# Patient Record
Sex: MALE | HISPANIC OR LATINO | Employment: FULL TIME | URBAN - METROPOLITAN AREA
[De-identification: names, ages, dates, MRNs, and addresses within clinical notes are randomized per-mention and may not be internally consistent; named-entity substitution may affect disease eponyms.]

---

## 2024-04-25 ENCOUNTER — HOSPITAL ENCOUNTER (EMERGENCY)
Facility: CLINIC | Age: 29
Discharge: HOME | End: 2024-04-25
Admitting: PHYSICIAN ASSISTANT
Payer: OTHER MISCELLANEOUS

## 2024-04-25 ENCOUNTER — APPOINTMENT (OUTPATIENT)
Dept: RADIOLOGY | Facility: CLINIC | Age: 29
End: 2024-04-25
Attending: PHYSICIAN ASSISTANT
Payer: OTHER MISCELLANEOUS

## 2024-04-25 VITALS
OXYGEN SATURATION: 97 % | HEIGHT: 68 IN | TEMPERATURE: 98 F | HEART RATE: 86 BPM | SYSTOLIC BLOOD PRESSURE: 170 MMHG | DIASTOLIC BLOOD PRESSURE: 101 MMHG | WEIGHT: 315 LBS | RESPIRATION RATE: 20 BRPM | BODY MASS INDEX: 47.74 KG/M2

## 2024-04-25 DIAGNOSIS — M54.12 CERVICAL RADICULOPATHY: ICD-10-CM

## 2024-04-25 DIAGNOSIS — M62.830 SPASM OF LEFT TRAPEZIUS MUSCLE: ICD-10-CM

## 2024-04-25 PROBLEM — I10 HTN (HYPERTENSION): Status: ACTIVE | Noted: 2023-10-30

## 2024-04-25 PROBLEM — H10.9 CONJUNCTIVITIS: Status: ACTIVE | Noted: 2024-04-25

## 2024-04-25 PROBLEM — B01.9 VARICELLA: Status: ACTIVE | Noted: 2024-04-25

## 2024-04-25 PROBLEM — E11.29 TYPE 2 DIABETES MELLITUS WITH MICROALBUMINURIA (H): Status: ACTIVE | Noted: 2023-10-30

## 2024-04-25 PROBLEM — R80.9 TYPE 2 DIABETES MELLITUS WITH MICROALBUMINURIA (H): Status: ACTIVE | Noted: 2023-10-30

## 2024-04-25 PROBLEM — H60.399 INFECTIVE OTITIS EXTERNA: Status: ACTIVE | Noted: 2024-04-25

## 2024-04-25 PROCEDURE — 250N000013 HC RX MED GY IP 250 OP 250 PS 637: Performed by: PHYSICIAN ASSISTANT

## 2024-04-25 PROCEDURE — 99284 EMERGENCY DEPT VISIT MOD MDM: CPT

## 2024-04-25 PROCEDURE — 72100 X-RAY EXAM L-S SPINE 2/3 VWS: CPT

## 2024-04-25 PROCEDURE — 72040 X-RAY EXAM NECK SPINE 2-3 VW: CPT

## 2024-04-25 PROCEDURE — 20552 NJX 1/MLT TRIGGER POINT 1/2: CPT

## 2024-04-25 PROCEDURE — 73030 X-RAY EXAM OF SHOULDER: CPT | Mod: LT

## 2024-04-25 RX ORDER — METHYLPREDNISOLONE 4 MG
TABLET, DOSE PACK ORAL
Qty: 21 TABLET | Refills: 0 | Status: SHIPPED | OUTPATIENT
Start: 2024-04-25

## 2024-04-25 RX ORDER — LIDOCAINE 4 G/G
1 PATCH TOPICAL ONCE
Status: DISCONTINUED | OUTPATIENT
Start: 2024-04-25 | End: 2024-04-25 | Stop reason: HOSPADM

## 2024-04-25 RX ORDER — GABAPENTIN 300 MG/1
300 CAPSULE ORAL AT BEDTIME
Qty: 14 CAPSULE | Refills: 0 | Status: SHIPPED | OUTPATIENT
Start: 2024-04-25 | End: 2024-08-15

## 2024-04-25 RX ADMIN — LIDOCAINE 1 PATCH: 4 PATCH TOPICAL at 11:38

## 2024-04-25 RX ADMIN — LIDOCAINE 1 PATCH: 4 PATCH TOPICAL at 11:39

## 2024-04-25 ASSESSMENT — COLUMBIA-SUICIDE SEVERITY RATING SCALE - C-SSRS
1. IN THE PAST MONTH, HAVE YOU WISHED YOU WERE DEAD OR WISHED YOU COULD GO TO SLEEP AND NOT WAKE UP?: NO
2. HAVE YOU ACTUALLY HAD ANY THOUGHTS OF KILLING YOURSELF IN THE PAST MONTH?: NO
6. HAVE YOU EVER DONE ANYTHING, STARTED TO DO ANYTHING, OR PREPARED TO DO ANYTHING TO END YOUR LIFE?: NO

## 2024-04-25 ASSESSMENT — ACTIVITIES OF DAILY LIVING (ADL)
ADLS_ACUITY_SCORE: 35

## 2024-04-25 NOTE — ED PROVIDER NOTES
EMERGENCY DEPARTMENT ENCOUNTER      NAME: Eugenio Rodriguez  AGE: 29 year old male  YOB: 1995  MRN: [id number]  EVALUATION DATE & TIME: No admission date for patient encounter.    PCP: No primary care provider on file.    ED PROVIDER: Jamaica Rodriguez PA-C      Chief Complaint   Patient presents with    Fall       FINAL IMPRESSION:  1. Cervical radiculopathy    2. Spasm of left trapezius muscle        ED COURSE  9:45 AM  Met and evaluated patient. Discussed ED plan.   11:55 PM discharged to home in good condition by RN.          MEDICAL DECISION MAKING:    Pertinent Labs & Imaging studies reviewed. (See chart for details)  29 year old male with a h/o htn, DM2 presents to the Emergency Department for evaluation of left arm, shoulder and left hip pain ongoing x 4-5 weeks after a slip and fall down several stairs. On exam he is alert, non toxic appearing and in no acute distress. Vitals are notable for elevated blood pressure of 180/80.  He does have palpable muscle spasm of the left trapezius.  There is slight midline lumbar spine tenderness to palpation.  Left-sided lumbar paraspinal muscle spasm also appreciated.  He retains full range of motion of the left arm with discomfort to internal rotation, full extension above head.  Equal strength as compared to the left.   strength is equal.  Distal sensation and circulation intact.  Remainder of exam is unremarkable.    Differential diagnosis includes cervical radiculopathy, muscle strain, muscle spasm, occult fracture, lumbar radiculopathy, sciatica, cauda equina.  X-ray of the cervical spine, lumbar spine and left shoulder shows mild hypertrophic change at the left AC Which may be related to early osteoarthritis.  He was given a trigger point injection and lidocaine patch with moderate relief.  Discussed referral to spine for complete evaluation of his suspected cervical radiculopathy, also provided referral for physical therapy.  We did  discuss the use of a steroid burst, he does have type 2 diabetes and is on metformin. He does drawn his sugars and will monitor closely. Also added gabapentin 300mg at bedtime x 14d.     There is no evidence of acute or emergent process requiring intervention at this time. Pt is appropriate for outpatient management. Provisional nature of today's diagnosis was discussed and strict return precautions were given. Pt expressed understanding and He was discharged to home in good condition.     Medical Decision Making  Obtained supplemental history:Supplemental history obtained?: No  Reviewed external records: External records reviewed?: Documented in chart and Outpatient Record: diabetes clinic 2/2024  Care impacted by chronic illness:Diabetes  Care significantly affected by social determinants of health:N/A  Did you consider but not order tests?: Work up considered but not performed and documented in chart, if applicable  Did you interpret images independently?: Independent interpretation of ECG and images noted in documentation, when applicable.  Consultation discussion with other provider:Did you involve another provider (consultant, MH, pharmacy, etc.)?: No  Discharge. I prescribed additional prescription strength medication(s) as charted. See documentation for any additional details.        CRITICAL CARE: None      MEDICATIONS GIVEN IN THE EMERGENCY:  Medications   Lidocaine (LIDOCARE) 4 % Patch 1 patch (1 patch Transdermal $Patch/Med Applied 4/25/24 1138)   Lidocaine (LIDOCARE) 4 % Patch 1 patch (1 patch Transdermal $Patch/Med Applied 4/25/24 1139)       NEW PRESCRIPTIONS STARTED AT TODAY'S ER VISIT  New Prescriptions    GABAPENTIN (NEURONTIN) 300 MG CAPSULE    Take 1 capsule (300 mg) by mouth at bedtime    METHYLPREDNISOLONE (MEDROL DOSEPAK) 4 MG TABLET THERAPY PACK    Follow Package Directions       =================================================================    HPI    Patient information was obtained from:  patient    Use of Intrepreter: N/A   Eugenio ELIZONDO Xavier Rodriguez is a 29 year old right handed male who presents for evaluation of left shoulder/arm and left lower back pain ongoing over the last 4 weeks. He initially had a fall from a work vehicle in which he was holding onto the handle prior to walking down 4-5 stairs when his foot slipped causing him to slide down the stairs on his left side and land on the ground on his left hip. He has been taking ibuprofen intermittently for the pain though reports over the last 4 weeks it has significantly limited his ability to perform daily activities such as driving/turning the wheel -- he now only uses his right hand to drive with since there is increased pain with left sided use. Notes that pain always follows the same path down the arm down the lateral aspect from the neck to shoulder to wrist. No numbness/tingling/decreased strength noted down the left arm.     He also has left sided hip pain which occasionally radiates down the left leg, though he notes that in the last several weeks the radiating pain has improved. He is able to ambulate without difficulty. No numbness or tingling of the legs. No loss of bowel/bladder control.       REVIEW OF SYSTEMS   As noted in HPI. All other systems negative.    PAST MEDICAL HISTORY:  Past Medical History:   Diagnosis Date    TBI (traumatic brain injury) (H)        PAST SURGICAL HISTORY:  No past surgical history on file.        CURRENT MEDICATIONS:    gabapentin (NEURONTIN) 300 MG capsule  methylPREDNISolone (MEDROL DOSEPAK) 4 MG tablet therapy pack  cyclobenzaprine (FLEXERIL) 10 MG tablet  ibuprofen (ADVIL,MOTRIN) 800 MG tablet        ALLERGIES:  No Known Allergies    FAMILY HISTORY:  No family history on file.    SOCIAL HISTORY:   Social History     Socioeconomic History    Marital status: Single     Spouse name: Not on file    Number of children: Not on file    Years of education: Not on file    Highest education level: Not on  "file   Occupational History    Not on file   Tobacco Use    Smoking status: Not on file    Smokeless tobacco: Not on file   Substance and Sexual Activity    Alcohol use: Not on file    Drug use: Not on file    Sexual activity: Not on file   Other Topics Concern    Not on file   Social History Narrative    Not on file     Social Determinants of Health     Financial Resource Strain: Low Risk  (11/29/2023)    Received from [facility]    Financial Resource Strain     Difficulty of Paying Living Expenses: 3     Difficulty of Paying Living Expenses: Not on file   Food Insecurity: No Food Insecurity (11/29/2023)    Received from [facility]    Food Insecurity     Worried About Running Out of Food in the Last Year: 1   Transportation Needs: No Transportation Needs (11/29/2023)    Received from [facility]    Transportation Needs     Lack of Transportation (Medical): 1   Physical Activity: Not on file   Stress: Not on file   Social Connections: Socially Integrated (11/29/2023)    Received from [facility]    Social Connections     Frequency of Communication with Friends and Family: 0   Interpersonal Safety: Not on file   Housing Stability: Low Risk  (11/29/2023)    Received from [facility]    Housing Stability     Unable to Pay for Housing in the Last Year: 1         VITALS:  Patient Vitals for the past 24 hrs:   BP Temp Temp src Pulse Resp SpO2 Height Weight   04/25/24 0925 (!) 180/80 98  F (36.7  C) Temporal 95 20 98 % 1.727 m (5' 8\") (!) 177.8 kg (392 lb)       PHYSICAL EXAM    Physical Exam  Vitals reviewed.   Constitutional:       General: He is not in acute distress.     Appearance: Normal appearance. He is not ill-appearing or toxic-appearing.   Cardiovascular:      Heart sounds: Normal heart sounds. No murmur heard.  Pulmonary:      Effort: Pulmonary " effort is normal. No respiratory distress.      Breath sounds: No stridor. No wheezing.   Abdominal:      General: Abdomen is flat.   Musculoskeletal:         General: Normal range of motion.      Cervical back: Normal range of motion. No rigidity.      Right lower leg: No edema.      Left lower leg: No edema.      Comments: Tenderness with palpable spasm of the left trapezius as well as tenderness with palpable spasm left paraspinous.     Able to range left shoulder fully with discomfort with full extension above head. Able to internally rotate with flat hand against back though discomfort with movement. Slight discomfort with mondragon on left.     No midline cervical or thoracic tenderness to palpation. There is tenderness with palpation of the lumbar spine.     strength 5/5 bilaterally. No weakness to external/internal rotation or flexion/extension against resistance.   Skin:     General: Skin is warm.      Capillary Refill: Capillary refill takes less than 2 seconds.      Findings: No lesion or rash.   Neurological:      General: No focal deficit present.      Mental Status: He is alert.   Psychiatric:         Mood and Affect: Mood normal.          LAB:  All pertinent labs reviewed and interpreted.    Labs Ordered and Resulted from Time of ED Arrival to Time of ED Departure - No data to display      RADIOLOGY:  Reviewed all pertinent imaging. Please see official radiology report    XR Lumbar Spine 2/3 Views   Final Result   IMPRESSION:    Presumed prominent L1 transverse processes. Lumbar spine alignment is within normal limits. No obvious loss of vertebral body height. Mild degenerative endplate changes and loss of disc height at L3-L4.      Cervical spine XR, 2-3 views   Final Result   IMPRESSION: Vertebral bodies are only well seen from C2 to C6 on the lateral view. Straightening of the normal cervical lordosis. No obvious loss of vertebral body height. No significant degenerative endplate changes or loss  of disc height.         XR Shoulder Left G/E 3 Views   Final Result   IMPRESSION: The left glenohumeral and acromioclavicular joints are negative for fracture. Mild hypertrophic change at the AC joint.            PROCEDURES:     1.  PROCEDURE: Trigger Point Injection   INDICATIONS: Muscle spasm   PROCEDURE PROVIDER: Jamaica Rodriguez PA-C   SITE: Left trapezius   MEDICATION: 8 mLs of 1% Lidocaine without epinephrine   NOTE: The skin overlying the site for injection was prepped with alcohol.  Using sterile technique I injected the above medication.  The patient had good response to the procedure.    COMPLICATIONS: Patient tolerated procedure well, without complication       2.  PROCEDURE: Trigger Point Injection   INDICATIONS: Muscle spasm   PROCEDURE PROVIDER: Jamaica Rodriguez PA-C   SITE: Left lumbar paraspinous   MEDICATION: 8 mLs of 1% Lidocaine without epinephrine   NOTE: The skin overlying the site for injection was prepped with alcohol.  Using sterile technique I injected the above medication.  The patient had good response to the procedure.    COMPLICATIONS: Patient tolerated procedure well, without complication           Jamaica Rodriguez PA-C  Emergency Medicine  [facility] EMERGENCY ROOM  [address] [place] [id number] [phone]  Dept: [phone]    This note has in part been created with speech recognition technology and may create an occasional, unintended word/grammar substitution. Errors are generally corrected in real time. Please message me via [employer] if you note any errors requiring clarification.       Jamaica Rodriguez PA-C  04/25/24 1908

## 2024-04-25 NOTE — ED TRIAGE NOTES
Pt arrives to ED with c/o ongoing pain due to a fall that occurred around 1 month ago at home. Pt states he fell out of his work truck and landed on his left side of his back. Endorses to left sided wrist pain that radiates up into his arm, shoulder and across his back. Pt also endorses to left sided hip pain. Denies any numbness or tingling. Has been taking ibuprofen for the pain with little relief. Pain gets so bad it wakes him up in the middle of the night.      Triage Assessment (Adult)       Row Name 04/25/24 0926          Triage Assessment    Airway WDL WDL        Respiratory WDL    Respiratory WDL WDL        Skin Circulation/Temperature WDL    Skin Circulation/Temperature WDL WDL        Cardiac WDL    Cardiac WDL WDL        Peripheral/Neurovascular WDL    Peripheral Neurovascular WDL WDL        Cognitive/Neuro/Behavioral WDL    Cognitive/Neuro/Behavioral WDL WDL

## 2024-04-25 NOTE — Clinical Note
Eugenio Rodriguez was seen and treated in our emergency department on 4/25/2024.  He may return to work on 04/29/2024.       If you have any questions or concerns, please don't hesitate to call.      Nevaeh Rodriguez, RN

## 2024-04-25 NOTE — Clinical Note
Eugenio Rodriguez was seen and treated in our emergency department on 4/25/2024.  He may return to work on 04/26/2024.       If you have any questions or concerns, please don't hesitate to call.      Nevaeh Rodriguez, RN

## 2024-04-25 NOTE — DISCHARGE INSTRUCTIONS
You were seen in the emergency department for evaluation of left arm and left lower back pain. I suspect one of the disks in your cervical spine may have slight compression. We did look at an XR of your cervical spine, left shoulder XR and lumbar spine. These do not show any sign of broken bones. You do have early changes of arthritis to your left shoulder.     Today you received a trigger point injection of lidocaine to the left trapezius and also the left sided lower back.     We discussed the use of steroids, I am going to start you on a steroid medication called methylprednisolone, this comes in a Medrol Dosepak.  Please take these tablets as written.  Steroids can sometimes bump your blood sugars, keep a close eye.  If you develop any irritability, anxiousness or intolerable jitteriness you may stop the use of the steroid.  It can also cause you to have trouble sleeping, I recommend that you take this is close to the start of the day as you can.  We also want to try medication called gabapentin.  Please take 300 mg once daily before sleep.  This medication can make you tired.  Do not drink alcohol or drive for 6-8 hours after taking this medication.     Return to the ER with any increased pain, weakness or numbness of the left arm.     I have placed a referral for the spine specialists as well as physical therapy team. They will guide your further on recommended next steps.

## 2024-08-15 ENCOUNTER — HOSPITAL ENCOUNTER (EMERGENCY)
Facility: CLINIC | Age: 29
Discharge: HOME | End: 2024-08-15
Attending: FAMILY MEDICINE | Admitting: FAMILY MEDICINE
Payer: OTHER MISCELLANEOUS

## 2024-08-15 VITALS
RESPIRATION RATE: 20 BRPM | HEART RATE: 99 BPM | HEIGHT: 68 IN | OXYGEN SATURATION: 95 % | BODY MASS INDEX: 47.74 KG/M2 | SYSTOLIC BLOOD PRESSURE: 141 MMHG | TEMPERATURE: 98 F | WEIGHT: 315 LBS | DIASTOLIC BLOOD PRESSURE: 75 MMHG

## 2024-08-15 DIAGNOSIS — M54.12 CERVICAL RADICULOPATHY: ICD-10-CM

## 2024-08-15 PROCEDURE — 99284 EMERGENCY DEPT VISIT MOD MDM: CPT

## 2024-08-15 PROCEDURE — 99283 EMERGENCY DEPT VISIT LOW MDM: CPT

## 2024-08-15 RX ORDER — OXYCODONE HYDROCHLORIDE 5 MG/1
5 TABLET ORAL EVERY 6 HOURS PRN
Qty: 6 TABLET | Refills: 0 | Status: SHIPPED | OUTPATIENT
Start: 2024-08-15 | End: 2024-08-18

## 2024-08-15 RX ORDER — METHOCARBAMOL 500 MG/1
500 TABLET, FILM COATED ORAL 4 TIMES DAILY PRN
Qty: 20 TABLET | Refills: 0 | Status: SHIPPED | OUTPATIENT
Start: 2024-08-15

## 2024-08-15 RX ORDER — PREDNISONE 10 MG/1
TABLET ORAL
Qty: 22 TABLET | Refills: 0 | Status: SHIPPED | OUTPATIENT
Start: 2024-08-15 | End: 2024-08-26

## 2024-08-15 RX ORDER — GABAPENTIN 300 MG/1
300 CAPSULE ORAL 3 TIMES DAILY
Qty: 30 CAPSULE | Refills: 0 | Status: SHIPPED | OUTPATIENT
Start: 2024-08-15

## 2024-08-15 ASSESSMENT — COLUMBIA-SUICIDE SEVERITY RATING SCALE - C-SSRS
2. HAVE YOU ACTUALLY HAD ANY THOUGHTS OF KILLING YOURSELF IN THE PAST MONTH?: NO
1. IN THE PAST MONTH, HAVE YOU WISHED YOU WERE DEAD OR WISHED YOU COULD GO TO SLEEP AND NOT WAKE UP?: NO
6. HAVE YOU EVER DONE ANYTHING, STARTED TO DO ANYTHING, OR PREPARED TO DO ANYTHING TO END YOUR LIFE?: NO

## 2024-08-15 NOTE — Clinical Note
Eugenio Rodriguez was seen and treated in our emergency department on 8/15/2024.  He may return to work on 08/20/2024.       If you have any questions or concerns, please don't hesitate to call.      Jerald Pressley MD

## 2024-08-15 NOTE — ED PROVIDER NOTES
EMERGENCY DEPARTMENT ENCOUNTER      NAME: Eugenio Rodriguez  AGE: 29 year old male  YOB: 1995  MRN: [id number]  EVALUATION DATE & TIME: No admission date for patient encounter.    PCP: Aminata Blevins    ED PROVIDER: Jerald Pressley M.D.    Chief Complaint   Patient presents with    Numbness    Neck Pain       FINAL IMPRESSION:  1. Cervical radiculopathy        ED COURSE & MEDICAL DECISION MAKING:    Pertinent Labs & Imaging studies independently interpreted by me. (See chart for details)  Reviewed most recent emergency department visit from April 2024 when patient was seen with left arm and shoulder pain that at that point had been going on for 4 to 5 weeks, x-rays negative and patient was discharged.  Patient was started on gabapentin at bedtime as well as Medrol Dosepak and referral to spine care although it does not appear that patient followed up.    ED Course as of 08/15/24 1223   Thu Aug 15, 2024   1144 Patient seen and examined, has several months of left-sided back pain with occasional pain into the left arm and some numbness in the left arm as well.  On exam here, tenderness of the left cervical trapezius and along the scapular border.  Strength and sensation of the left upper extremity intact including , wrist flexion extension, elbow flexion extension, and shoulder shrug.  Patient was started on prednisone, oxycodone, gabapentin, Robaxin and referral to spine care clinic.  Patient likely would benefit from ongoing physical therapy and may need advanced imaging but no indication for emergent cervical MRI today         At the conclusion of the encounter I discussed the results of all of the tests and the disposition. The questions were answered. The patient or family acknowledged understanding and was agreeable with the care plan.     Medical Decision Making  Obtained supplemental history:Supplemental history obtained?: No  Reviewed external records: External records  reviewed?: Documented in chart and Inpatient Record: 04/25/2024 [facility] Emergency Room  Care impacted by chronic illness:Other: morbid obesity  Care significantly affected by social determinants of health:Access to Affordable Health Care  Did you consider but not order tests?: Work up considered but not performed and documented in chart, if applicable  Did you interpret images independently?: Independent interpretation of ECG and images noted in documentation, when applicable.  Consultation discussion with other provider:Did you involve another provider (consultant, , pharmacy, etc.)?: No  Discharge. I prescribed additional prescription strength medication(s) as charted. N/A.    MEDICATIONS GIVEN IN THE EMERGENCY:  Medications - No data to display    NEW PRESCRIPTIONS STARTED AT TODAY'S ER VISIT  Discharge Medication List as of 8/15/2024 11:52 AM        START taking these medications    Details   methocarbamol (ROBAXIN) 500 MG tablet Take 1 tablet (500 mg) by mouth 4 times daily as needed for muscle spasms, Disp-20 tablet, R-0, E-Prescribe      oxyCODONE (ROXICODONE) 5 MG tablet Take 1 tablet (5 mg) by mouth every 6 hours as needed for severe pain, Disp-6 tablet, R-0, E-Prescribe      predniSONE (DELTASONE) 10 MG tablet Take 4 tablets (40 mg) by mouth daily for 3 days, THEN 2 tablets (20 mg) daily for 3 days, THEN 1 tablet (10 mg) daily for 3 days, THEN 0.5 tablets (5 mg) daily for 2 days., Disp-22 tablet, R-0, E-Prescribe             =================================================================    HPI    Patient information was obtained from: Patient       Eugenio Rodriguez is a 29 year old male with a pertinent history of hypertension and type 2 diabetes who presents to this ED by private car for evaluation of numbness and neck pain.    Patient stated he had a work accident about 3-4 months ago. He has visited the ED earlier for the same issue and was found to have  "some kind of cervical disc issue. He was prescribed some medications which patient reports did not resolve the problem. Patient does also endorse a referral to PT. Additionally, he was given a follow up appointment, however, was never able to attend due to \"being forced,\" to work.     He reported that the pain came back about a week and a half ago. The left arm also started going numb and the pain radiates to his left neck and shoulder blade region. With rotation of the neck/head, patient reports this provoking the pain. He also shares that he is not able to get sleep because of the pain and the only way he can sleep is sitting up. He's attempted stretches and Ibuprofen/Tylenol, yet nothing has helped.     Patient denies any new injury or trauma to the region. Mentions diabetes mellitus and hypertension.     Otherwise in normal state of health. No further concerns at this time.     Per chart review, on 04/25/2024 at [facility] Emergency Room patient came in for an evaluation of left shoulder/arm and left lower back pain.There is no evidence of acute or emergent process requiring intervention at this time. Pt is appropriate for outpatient management. Provisional nature of today's diagnosis was discussed and strict return precautions were given.     REVIEW OF SYSTEMS   Review of Systems   All other systems reviewed and negative    PAST MEDICAL HISTORY:  Past Medical History:   Diagnosis Date    TBI (traumatic brain injury) (H)        PAST SURGICAL HISTORY:  No past surgical history on file.    CURRENT MEDICATIONS:    No current facility-administered medications for this encounter.     Current Outpatient Medications   Medication Sig Dispense Refill    gabapentin (NEURONTIN) 300 MG capsule Take 1 capsule (300 mg) by mouth 3 times daily 30 capsule 0    methocarbamol (ROBAXIN) 500 MG tablet Take 1 tablet (500 mg) by mouth 4 times daily as needed for muscle spasms 20 tablet 0    oxyCODONE " (ROXICODONE) 5 MG tablet Take 1 tablet (5 mg) by mouth every 6 hours as needed for severe pain 6 tablet 0    predniSONE (DELTASONE) 10 MG tablet Take 4 tablets (40 mg) by mouth daily for 3 days, THEN 2 tablets (20 mg) daily for 3 days, THEN 1 tablet (10 mg) daily for 3 days, THEN 0.5 tablets (5 mg) daily for 2 days. 22 tablet 0    cyclobenzaprine (FLEXERIL) 10 MG tablet [CYCLOBENZAPRINE (FLEXERIL) 10 MG TABLET] Take 1 tablet (10 mg total) by mouth 3 (three) times a day as needed for muscle spasms. 20 tablet 0    ibuprofen (ADVIL,MOTRIN) 800 MG tablet [IBUPROFEN (ADVIL,MOTRIN) 800 MG TABLET] Take 1 tablet (800 mg total) by mouth 3 (three) times a day as needed for pain. 21 tablet 0    methylPREDNISolone (MEDROL DOSEPAK) 4 MG tablet therapy pack Follow Package Directions 21 tablet 0       ALLERGIES:  No Known Allergies    FAMILY HISTORY:  No family history on file.    SOCIAL HISTORY:   Social History     Socioeconomic History    Marital status: Single     Social Determinants of Health     Financial Resource Strain: Low Risk  (11/29/2023)    [facility]    Financial Resource Strain     Difficulty of Paying Living Expenses: 3   Food Insecurity: No Food Insecurity (11/29/2023)    [facility]    Food Insecurity     Worried About Running Out of Food in the Last Year: 1   Transportation Needs: No Transportation Needs (11/29/2023)    [facility]    Transportation Needs     Lack of Transportation (Medical): 1   Social Connections: Socially Integrated (11/29/2023)    [facility]    Social Connections     Frequency of Communication with Friends and Family: 0   Housing Stability: Low Risk  (11/29/2023)    [facility]    Housing Stability     Unable to Pay for Housing in the Last Year: 1  "      VITALS:  BP (!) 141/75   Pulse 99   Temp 98  F (36.7  C) (Temporal)   Resp 20   Ht 1.727 m (5' 8\")   Wt (!) 179.9 kg (396 lb 8 oz)   SpO2 95%   BMI 60.29 kg/m      PHYSICAL EXAM:  Physical Exam  Vitals and nursing note reviewed.   Constitutional:       Appearance: Normal appearance.   HENT:      Head: Normocephalic and atraumatic.      Right Ear: External ear normal.      Left Ear: External ear normal.      Nose: Nose normal.      Mouth/Throat:      Mouth: Mucous membranes are moist.   Eyes:      Extraocular Movements: Extraocular movements intact.      Conjunctiva/sclera: Conjunctivae normal.      Pupils: Pupils are equal, round, and reactive to light.   Cardiovascular:      Rate and Rhythm: Normal rate and regular rhythm.   Pulmonary:      Effort: Pulmonary effort is normal.      Breath sounds: Normal breath sounds. No wheezing or rales.   Abdominal:      General: Abdomen is flat. There is no distension.      Palpations: Abdomen is soft.      Tenderness: There is no abdominal tenderness. There is no guarding.   Musculoskeletal:         General: Normal range of motion.      Cervical back: Normal range of motion and neck supple.      Right lower leg: No edema.      Left lower leg: No edema.      Comments: Left trapezius and scapula muscle tenderness.    Lymphadenopathy:      Cervical: No cervical adenopathy.   Skin:     General: Skin is warm and dry.   Neurological:      General: No focal deficit present.      Mental Status: He is alert and oriented to person, place, and time. Mental status is at baseline.      Comments: Upper extremity strength and sensation 5/5 bilaterally.   Psychiatric:         Mood and Affect: Mood normal.         Behavior: Behavior normal.         Thought Content: Thought content normal.     I, Marques Johnson, am serving as a scribe to document services personally performed by Dr. Pressley based on my observation and the provider's statements to me. I, Jerald Pressley MD " attest that Marques Johnson is acting in a scribe capacity, has observed my performance of the services and has documented them in accordance with my direction.    Jerald Pressley M.D.  Emergency Medicine  [facility] EMERGENCY ROOM  [address] [address]  [phone]  Dept: [phone]       Jerald Pressley MD  08/15/24 1223

## 2024-08-15 NOTE — ED TRIAGE NOTES
Arrives to ED with c/o worsening neck and LUE pain. Reports work injury x2 months ago. Was unable to complete PT d/t work. Reports neck stiffness and LUE numbness worsening over last 1.5 weeks.      Triage Assessment (Adult)       Row Name 08/15/24 1128          Triage Assessment    Airway WDL WDL        Respiratory WDL    Respiratory WDL WDL        Skin Circulation/Temperature WDL    Skin Circulation/Temperature WDL WDL        Cardiac WDL    Cardiac WDL X;rhythm     Pulse Rate & Regularity tachycardic        Peripheral/Neurovascular WDL    Peripheral Neurovascular WDL X;neurovascular assessment upper        Cognitive/Neuro/Behavioral WDL    Cognitive/Neuro/Behavioral WDL WDL        LUE Neurovascular Assessment    Sensation LUE numbness present

## 2024-08-15 NOTE — Clinical Note
Eugenio Rodriguez was seen and treated in our emergency department on 8/15/2024.  He may return to work on 08/17/2024.       If you have any questions or concerns, please don't hesitate to call.      Jerald Pressley MD

## 2024-08-29 ENCOUNTER — OFFICE VISIT (OUTPATIENT)
Dept: PHYSICAL MEDICINE AND REHAB | Facility: CLINIC | Age: 29
End: 2024-08-29
Attending: FAMILY MEDICINE
Payer: OTHER MISCELLANEOUS

## 2024-08-29 VITALS
BODY MASS INDEX: 47.74 KG/M2 | HEART RATE: 127 BPM | WEIGHT: 315 LBS | DIASTOLIC BLOOD PRESSURE: 75 MMHG | HEIGHT: 68 IN | SYSTOLIC BLOOD PRESSURE: 140 MMHG

## 2024-08-29 DIAGNOSIS — M54.12 CERVICAL RADICULOPATHY: ICD-10-CM

## 2024-08-29 PROCEDURE — 99204 OFFICE O/P NEW MOD 45 MIN: CPT | Performed by: NURSE PRACTITIONER

## 2024-08-29 RX ORDER — PREGABALIN 75 MG/1
CAPSULE ORAL
Qty: 90 CAPSULE | Refills: 1 | Status: SHIPPED | OUTPATIENT
Start: 2024-08-29

## 2024-08-29 RX ORDER — NAPROXEN 500 MG/1
500 TABLET ORAL 2 TIMES DAILY WITH MEALS
Qty: 40 TABLET | Refills: 0 | Status: SHIPPED | OUTPATIENT
Start: 2024-08-29

## 2024-08-29 RX ORDER — CYCLOBENZAPRINE HCL 10 MG
10 TABLET ORAL 3 TIMES DAILY PRN
Qty: 45 TABLET | Refills: 0 | Status: SHIPPED | OUTPATIENT
Start: 2024-08-29

## 2024-08-29 ASSESSMENT — PAIN SCALES - GENERAL: PAINLEVEL: EXTREME PAIN (8)

## 2024-08-29 NOTE — LETTER
8/29/2024      Eugenio Rodriguez  [address]  [place] [address]      Dear Colleague,    Thank you for referring your patient, Eugenio Rodriguez, to the [facility]. Please see a copy of my visit note below.    ASSESSMENT: Eugenio Rodriguez is a 29 year old male presents for consultation at the request of PCP Clinic, Aminata Lazo, who presents today for new patient evaluation of :     -cervical radiculopathy    Persistent left arm pain, numbness. Decreased sensation elbow down into all fingers with 4-/5 intrinsics, extensors and left thumb abduction, 4/5 left wrist extension on exam. Reflexes 0/4 ?r.t habitus. Recommended cervical MRI for further evaluation. Will review results and discuss plan. Given weakness, would consider referral for neurosurgery consult. For pain, sent flexeril, naproxen, and lyrica to pharmacy. He will call if side effects occur.           No data to display                     Diagnoses and all orders for this visit:  Cervical radiculopathy  -     Spine  Referral  -     MR Cervical Spine w/o Contrast; Future  -     cyclobenzaprine (FLEXERIL) 10 MG tablet; Take 1 tablet (10 mg) by mouth 3 times daily as needed.  -     pregabalin (LYRICA) 75 MG capsule; Take 75mg once daily x 3 days, then twice daily x 3 days, then three times daily ongoing  -     naproxen (NAPROSYN) 500 MG tablet; Take 1 tablet (500 mg) by mouth 2 times daily (with meals).       PLAN:  Reviewed spine anatomy and disease process. Discussed diagnosis and treatment options with the patient today. A shared decision making model was used. The patient's values and choices were respected. The following represents what was discussed and decided upon by the provider and the patient.     -DIAGNOSTIC TESTS:  Images were personally reviewed and interpreted and explained to patient today using spine model.   -MRI cervical spine without contrast ordered  today    -PHYSICAL THERAPY:    -Patient in too much pain to participate in PT at this time  Discussed the importance of core strengthening, ROM, stretching exercises with the patient and how each of these entities is important in decreasing pain.  Explained to the patient that the purpose of physical therapy is to teach the patient a home exercise program.  These exercises need to be performed every day in order to decrease pain and prevent future occurrences of pain.    -MEDICATIONS:    -start lyrica  -start naproxen  - start flexeril  Discussed multiple medication options today with patient. Discussed risks, side effects, and proper use of medications. Patient verbalized understanding.    -INTERVENTIONS:    -Discussed the role of injections with patient today. Patient would be a good candidate in the future for either epidural steroid injections or medial branch blocks if indicated based on symptoms and supported by imaging results.    -PATIENT EDUCATION: Total time of 40 minutes, on the day of service, spent with the patient, reviewing the chart, placing orders, and documenting.   -Today we also discussed the issues related to the pros and cons of the current treatment plan.    -FOLLOW-UP:  we will call with results of imaging    Advised patient to call the Spine Center if symptoms worsen or if they develop red flag symptoms such as numbness, weakness, severe pain uncontrolled by current pain med regimen, or any new or worsening problems controlling bladder and bowel function.   ______________________________________________________________________    SUBJECTIVE:   Eugenio Rodriguez  is a 29 year old male hx type 2 diabetes, htn  who presents today for new patient evaluation of cervical radiculopathy.    Work injury in March when he fell off of a machine onto his left side. Symptoms started suddenly. Went to ED 4/25  Left sided neck pain into the anterior chest, left arm. into the scap, shoulder, arm,  forearm. His has continued. Pain is 8/10 today, 10/10 at worst.  1mo left arm numbness, arm falling asleep.   The gabapentin was helping to some degree, he recently ran out.  Did have side effects on it however.  He tried to go swimming  few days ago, this made symptoms worse. He felt electricity shocks all over his body.    Xr cervical and lumbar spine done in April.  He has not had any physical therapy yet  No chiropractic care, injections, or previous surgeries    -Treatment to Date:     -Medications:  gabapentin    Current Outpatient Medications   Medication Sig Dispense Refill     cyclobenzaprine (FLEXERIL) 10 MG tablet Take 1 tablet (10 mg) by mouth 3 times daily as needed. 45 tablet 0     cyclobenzaprine (FLEXERIL) 10 MG tablet [CYCLOBENZAPRINE (FLEXERIL) 10 MG TABLET] Take 1 tablet (10 mg total) by mouth 3 (three) times a day as needed for muscle spasms. 20 tablet 0     gabapentin (NEURONTIN) 300 MG capsule Take 1 capsule (300 mg) by mouth 3 times daily 30 capsule 0     methocarbamol (ROBAXIN) 500 MG tablet Take 1 tablet (500 mg) by mouth 4 times daily as needed for muscle spasms 20 tablet 0     naproxen (NAPROSYN) 500 MG tablet Take 1 tablet (500 mg) by mouth 2 times daily (with meals). 40 tablet 0     pregabalin (LYRICA) 75 MG capsule Take 75mg once daily x 3 days, then twice daily x 3 days, then three times daily ongoing 90 capsule 1     ibuprofen (ADVIL,MOTRIN) 800 MG tablet [IBUPROFEN (ADVIL,MOTRIN) 800 MG TABLET] Take 1 tablet (800 mg total) by mouth 3 (three) times a day as needed for pain. (Patient not taking: Reported on 8/29/2024) 21 tablet 0     methylPREDNISolone (MEDROL DOSEPAK) 4 MG tablet therapy pack Follow Package Directions (Patient not taking: Reported on 8/29/2024) 21 tablet 0     No current facility-administered medications for this visit.       No Known Allergies    Past Medical History:   Diagnosis Date     TBI (traumatic brain injury) (H)         Patient Active Problem List  "  Diagnosis     Conjunctivitis     Acne     Varicella     Infective otitis externa     HTN (hypertension)     Type 2 diabetes mellitus with microalbuminuria (H)       No past surgical history on file.    No family history on file.    Reviewed past medical, surgical, and family history with patient found on new patient intake packet located in EMR Media tab.     SOCIAL HX: sometimes smokes, no alcohol use, no heavy drinking, no rec drug use    Oswestry (IRIS) Questionnaire:         No data to display                Neck Disability Index:       No data to display                   PHQ-2 Score:       8/29/2024     2:51 PM   PHQ-2 ( 1999 [employer])   Q1: Little interest or pleasure in doing things 0   Q2: Feeling down, depressed or hopeless 1   PHQ-2 Score 1          ROS: positive for weight gain, headache, chest pain, wheezing, constipation, muscle pain, muscle fatigue, imbalance, insomnia, anxiety. Specifically negative for bowel/bladder dysfunction, balance changes, headache, dizziness, foot drop, fevers, chills, appetite changes, nausea/vomiting, unexplained weight loss. Otherwise 13 systems reviewed are negative. Please see the patient's intake questionnaire from today for details.    OBJECTIVE:  BP (!) 140/75   Pulse (!) 127   Ht 5' 8\" (1.727 m)   Wt (!) 390 lb (176.9 kg)   BMI 59.30 kg/m      PHYSICAL EXAMINATION:  --CONSTITUTIONAL: Vital signs as above. No acute distress. The patient is well nourished and well groomed.  --PSYCHIATRIC: The patient is awake, alert, oriented to person, place, and time, and answering questions appropriately with clear speech. Appropriate mood and affect   --HEENT: Sclera are non-injected. Extraocular muscles are intact. Moist oral mucosa.  --SKIN: Skin over the face, bilateral upper extremities, and posterior torso is clean, dry, intact without rashes.  --LYMPH NODES: No palpable or tender anterior/posterior cervical, submandibular, or supraclavicular lymph nodes.  --RESPIRATORY: " Normal rhythm and effort. No abnormal accessory muscle breathing patterns noted.     --NEUROLOGIC: CN III-XII are grossly intact.   --GROSS MOTOR: Easily arises from a seated position. Toe walking, heel walking, and tandem gait are normal.      --UPPER EXTREMITY MOTOR TESTING:  Shoulder abduction: right 5/5, left 5/5  Triceps: right 5/5 left 5/5  Biceps:right 5/5  left 5/5,   Wrist flexion: right 5/5  left 5/5,   Wrist extension: right 5/5  left 4/5,   Hand :  right 5/5  left 5/5,   Intrinsics: right 5/5  left 4-/5,   Extensors: right 5/5  left 4-/5,   4-/5 left thumb abduction    --LOWER EXTREMITY MOTOR TESTING  Hip flexion: right 5/5  left 5/5,   Quads:right 5/5  left 5/5,   Hamstrings: right 5/5  left 5/5,   Dorsiflexion: right 5/5  left 5/5,   Plantarflexion: right 5/5  left 5/5,   EHL: right 5/5  left 5/5,     Decreased sensation left arm elbow down into all fingers    Reflexes elijah upper and lower ext 0/4     Negative Clonus, Babinski, and Ramos's bilaterally.      --VASCULAR: Warm upper and lower limbs bilaterally. No swelling or color change noted.    --CERVICAL SPINE: Inspection reveals no evidence of deformity or swelling. No point tenderness to palpation of cervical spine. Positive tenderness to palpation of left trap, left lower paraspinal musculature.    --WRISTS: Full range of motion bilaterally, negative tinel and phalen signs bilaterally.      RESULTS:   Prior medical records from [facility] were reviewed today.         IMAGING:  Spine imaging was personally reviewed and interpreted today. The images were shown to the patient and the findings were explained using a spine model.    EXAM: XR CERVICAL SPINE 2/3 VIEWS  LOCATION: [facility]  DATE: 4/25/2024     INDICATION: Fall, pain.  COMPARISON: None.                                                                      IMPRESSION: Vertebral bodies are only well seen from C2 to C6 on the lateral  view. Straightening of the normal cervical lordosis. No obvious loss of vertebral body height. No significant degenerative endplate changes or loss of disc height.    EXAM: XR LUMBAR SPINE 2/3 VIEWS  LOCATION: [facility]  DATE: 4/25/2024     INDICATION: Midline lumbar pain.  COMPARISON: None.                                                                      IMPRESSION:   Presumed prominent L1 transverse processes. Lumbar spine alignment is within normal limits. No obvious loss of vertebral body height. Mild degenerative endplate changes and loss of disc height at L3-L4          This note was dictated using voice recognition software. Any grammatical or context distortions are unintentional and inherent to the software.       Medina Ruiz FNP-C  [facility]  O. [phone]      Again, thank you for allowing me to participate in the care of your patient.        Sincerely,        AN Greenberg CNP

## 2024-08-29 NOTE — LETTER
August 29, 2024      Eugenio oRdriguez  [address] [address]        To Whom It May Concern:    Eugenio Rodriguez was seen in our clinic. He may return to work with the following restrictions. No lifting, pushing, pulling, bending, twisting, or driving until MRI results available. 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 Please feel free to contact our office with questions or concerns        Sincerely,        Medina SIERRA  [facility]  O. [phone]

## 2024-08-29 NOTE — PATIENT INSTRUCTIONS
Do not  your gabapentin at the pharmacy We will be switching to Lyrica  Prescribed Lyrica today, 75mg tablets, to be titrated up to 1 tablet 3 times a day as tolerated for your nerve pain. Please follow dosing chart below.    Lyrica 75mg Dosing Chart    DATE  MORNING AFTERNOON BEDTIME    Day 1 0 0 1    Day 2 0 0 1    Day 3 0 0 1    Day 4 1 0 1    Day 5 1 0 1    Day 6 1 0 1    Day 7 1 1 1    Day 8 1 1 1    Day 9 1 1 1     Continue medication, taking 1 capsule three times daily    Please call if you have any questions regarding how to take your medication  Clinic Phone # [phone]            Flexeril 10mg (muscle relaxant medication) has been prescribed today. Please take one tablet three times daily as needed. This medication may cause drowsiness. Please do not work or drive while taking this medication until you know how it affects you. If it does make you drowsy, you should only take it before bedtime or at times that you do not have to work/drive.     Prescribed naproxen today. Please take as prescribed, as needed for pain control as well as to aid in decreasing inflammation.   Take medication with a full glass of water and with food.   *Do not take Advil, Ibuprofen, Aleve, or Naproxen while taking this medication as it can cause organ failure if taken together*  This medication does have risks if taken long-term, these risks include: gastrointestinal irritation, kidney dysfunction, and cardiovascular effects.  We will check your kidney function as needed while you're on this medication.  Stop taking this medication if you have intolerable side effects or blood in the stool.       Imaging (cervical MRI) has been ordered today.   Radiology will call you to schedule. Please call below if you do not hear from them in the next couple of days.     [facility] Radiology Scheduling:  Please call [phone] to schedule your image(s) (select option #1).    There are 3 different locations:    [facility]  "[facility]  [address] [address]    [facility]  [address], [address]   [place] [id number]    [facility]  [address] [address]       Radicular Pain    Radicular pain in either the arm or leg is usually from a bulging disc in the spine. A portion of the herniated disc may press against the nerves as the nerves exit the spine. This causes pain which is felt down the arm or leg. Other causes of radicular pain may include:  Fractures.  Heart disease.  Cancer.  An abnormal and usually degenerative state of the nervous system or nerves (neuropathy).    In most cases, radicular pain is treated without imaging unless symptoms do not start to improve. If that is the case, your provider may order a CT or MRI scan to determine the cause.     Nerves in the cervical spine (neck) may cause radicular pain into the outer shoulder and down the arm. It can spread down to the thumb and fingers. The symptoms vary depending on which nerve root has been affected. In most cases, radicular pain improves with conservative treatment such as physical therapy, cervical traction, medications, and epidural steroid injections. A program for neck rehabilitation with stretching and strengthening exercises is an important part of management. Treatment may take months, and surgery may be considered as a last resort if the symptoms do not improve.    Nerves in the lumbar spine (lower back) may cause radicular pain into the hip and down the leg. The commonly used term for this type of pain is \"sciatica\". Conservative treatment is also recommended for this problem. Most patients feel better after 2 to 4 weeks of rest and other supportive care. You should avoid bending, lifting, and all other activities which can make your pain worse. Physical therapy, traction, medications, and epidural steroid injections can be good options to help with your recovery. A program " for back injury rehabilitation with stretching and strengthening exercises is an important part of management. Surgery may be considered as a last resort if symptoms do not improve with conservative treatment.     You may take over-the-counter or prescription medicines for pain, discomfort, or fever as directed by your caregiver. Muscle relaxants may help by relieving more severe pain and spasm. Neuropathic medication (such as gabapentin, lyrica, or cymbalta) can help decrease your symptoms of nerve pain as well. Cold or massage can also give significant relief. Spinal manipulation is not recommended as it can increase the degree of disc protrusion. We do not recommend taking narcotic medication such as percocet, oxycodone, norco, dilaudid, or others unless pain is severe and not controlled with any other oral options.    Epidural steroid injections are often effective treatment for radicular pain. These injections deliver strong anti-inflammatory medicine to the area directly around the nerve root in the space between your back bones (vertebrae). Your provider can give you more information about steroid injections. These injections are most effective when given within two weeks of the onset of acute pain. You should see your provider for follow up care as recommended.     In most cases, radicular pain is treated without imaging unless symptoms do not start to improve. If that is the case, your provider may order a CT or MRI scan to determine the cause.     SEEK IMMEDIATE MEDICAL CARE IF:  You develop increased pain, weakness, or numbness in your arm or leg.  You develop difficulty with bladder or bowel control.  You develop abdominal pain.    Document Released: 01/25/2006 Document Revised: 03/11/2013 Document Reviewed: 04/12/2010  Patient Information  2013 [employer].         ~Please call our [facility] Nurse Navigation line [phone] with any questions or concerns about your treatment plan, if  symptoms worsen and you would like to be seen urgently, or if you have any new or worsening numbness, weakness, or problems controlling bladder and bowel function.  ~You are also welcome to contact Medina Ruiz via [employer], but please be aware that responses to [employer] message may take 2-3 days due to the high volume of patients seen in clinic.

## 2024-08-29 NOTE — PROGRESS NOTES
ASSESSMENT: Eugenio Rodriguez is a 29 year old male presents for consultation at the request of PCP Clinic, Aminata Lazo, who presents today for new patient evaluation of :     -cervical radiculopathy    Persistent left arm pain, numbness. Decreased sensation elbow down into all fingers with 4-/5 intrinsics, extensors and left thumb abduction, 4/5 left wrist extension on exam. Reflexes 0/4 ?r.t habitus. Recommended cervical MRI for further evaluation. Will review results and discuss plan. Given weakness, would consider referral for neurosurgery consult. For pain, sent flexeril, naproxen, and lyrica to pharmacy. He will call if side effects occur.           No data to display                     Diagnoses and all orders for this visit:  Cervical radiculopathy  -     Spine  Referral  -     MR Cervical Spine w/o Contrast; Future  -     cyclobenzaprine (FLEXERIL) 10 MG tablet; Take 1 tablet (10 mg) by mouth 3 times daily as needed.  -     pregabalin (LYRICA) 75 MG capsule; Take 75mg once daily x 3 days, then twice daily x 3 days, then three times daily ongoing  -     naproxen (NAPROSYN) 500 MG tablet; Take 1 tablet (500 mg) by mouth 2 times daily (with meals).       PLAN:  Reviewed spine anatomy and disease process. Discussed diagnosis and treatment options with the patient today. A shared decision making model was used. The patient's values and choices were respected. The following represents what was discussed and decided upon by the provider and the patient.     -DIAGNOSTIC TESTS:  Images were personally reviewed and interpreted and explained to patient today using spine model.   -MRI cervical spine without contrast ordered today    -PHYSICAL THERAPY:    -Patient in too much pain to participate in PT at this time  Discussed the importance of core strengthening, ROM, stretching exercises with the patient and how each of these entities is important in decreasing pain.  Explained to the patient that  the purpose of physical therapy is to teach the patient a home exercise program.  These exercises need to be performed every day in order to decrease pain and prevent future occurrences of pain.    -MEDICATIONS:    -start lyrica  -start naproxen  - start flexeril  Discussed multiple medication options today with patient. Discussed risks, side effects, and proper use of medications. Patient verbalized understanding.    -INTERVENTIONS:    -Discussed the role of injections with patient today. Patient would be a good candidate in the future for either epidural steroid injections or medial branch blocks if indicated based on symptoms and supported by imaging results.    -PATIENT EDUCATION: Total time of 40 minutes, on the day of service, spent with the patient, reviewing the chart, placing orders, and documenting.   -Today we also discussed the issues related to the pros and cons of the current treatment plan.    -FOLLOW-UP:  we will call with results of imaging    Advised patient to call the Spine Center if symptoms worsen or if they develop red flag symptoms such as numbness, weakness, severe pain uncontrolled by current pain med regimen, or any new or worsening problems controlling bladder and bowel function.   ______________________________________________________________________    SUBJECTIVE:   Eugenio Rodriguez  is a 29 year old male hx type 2 diabetes, htn  who presents today for new patient evaluation of cervical radiculopathy.    Work injury in March when he fell off of a machine onto his left side. Symptoms started suddenly. Went to ED 4/25  Left sided neck pain into the anterior chest, left arm. into the scap, shoulder, arm, forearm. His has continued. Pain is 8/10 today, 10/10 at worst.  1mo left arm numbness, arm falling asleep.   The gabapentin was helping to some degree, he recently ran out.  Did have side effects on it however.  He tried to go swimming  few days ago, this made symptoms worse. He  felt electricity shocks all over his body.    Xr cervical and lumbar spine done in April.  He has not had any physical therapy yet  No chiropractic care, injections, or previous surgeries    -Treatment to Date:     -Medications:  gabapentin    Current Outpatient Medications   Medication Sig Dispense Refill    cyclobenzaprine (FLEXERIL) 10 MG tablet Take 1 tablet (10 mg) by mouth 3 times daily as needed. 45 tablet 0    cyclobenzaprine (FLEXERIL) 10 MG tablet [CYCLOBENZAPRINE (FLEXERIL) 10 MG TABLET] Take 1 tablet (10 mg total) by mouth 3 (three) times a day as needed for muscle spasms. 20 tablet 0    gabapentin (NEURONTIN) 300 MG capsule Take 1 capsule (300 mg) by mouth 3 times daily 30 capsule 0    methocarbamol (ROBAXIN) 500 MG tablet Take 1 tablet (500 mg) by mouth 4 times daily as needed for muscle spasms 20 tablet 0    naproxen (NAPROSYN) 500 MG tablet Take 1 tablet (500 mg) by mouth 2 times daily (with meals). 40 tablet 0    pregabalin (LYRICA) 75 MG capsule Take 75mg once daily x 3 days, then twice daily x 3 days, then three times daily ongoing 90 capsule 1    ibuprofen (ADVIL,MOTRIN) 800 MG tablet [IBUPROFEN (ADVIL,MOTRIN) 800 MG TABLET] Take 1 tablet (800 mg total) by mouth 3 (three) times a day as needed for pain. (Patient not taking: Reported on 8/29/2024) 21 tablet 0    methylPREDNISolone (MEDROL DOSEPAK) 4 MG tablet therapy pack Follow Package Directions (Patient not taking: Reported on 8/29/2024) 21 tablet 0     No current facility-administered medications for this visit.       No Known Allergies    Past Medical History:   Diagnosis Date    TBI (traumatic brain injury) (H)         Patient Active Problem List   Diagnosis    Conjunctivitis    Acne    Varicella    Infective otitis externa    HTN (hypertension)    Type 2 diabetes mellitus with microalbuminuria (H)       No past surgical history on file.    No family history on file.    Reviewed past medical, surgical, and family history with patient found  "on new patient intake packet located in EMR Media tab.     SOCIAL HX: sometimes smokes, no alcohol use, no heavy drinking, no rec drug use    Oswestry (IRIS) Questionnaire:         No data to display                Neck Disability Index:       No data to display                   PHQ-2 Score:       8/29/2024     2:51 PM   PHQ-2 ( 1999 [employer])   Q1: Little interest or pleasure in doing things 0   Q2: Feeling down, depressed or hopeless 1   PHQ-2 Score 1          ROS: positive for weight gain, headache, chest pain, wheezing, constipation, muscle pain, muscle fatigue, imbalance, insomnia, anxiety. Specifically negative for bowel/bladder dysfunction, balance changes, headache, dizziness, foot drop, fevers, chills, appetite changes, nausea/vomiting, unexplained weight loss. Otherwise 13 systems reviewed are negative. Please see the patient's intake questionnaire from today for details.    OBJECTIVE:  BP (!) 140/75   Pulse (!) 127   Ht 5' 8\" (1.727 m)   Wt (!) 390 lb (176.9 kg)   BMI 59.30 kg/m      PHYSICAL EXAMINATION:  --CONSTITUTIONAL: Vital signs as above. No acute distress. The patient is well nourished and well groomed.  --PSYCHIATRIC: The patient is awake, alert, oriented to person, place, and time, and answering questions appropriately with clear speech. Appropriate mood and affect   --HEENT: Sclera are non-injected. Extraocular muscles are intact. Moist oral mucosa.  --SKIN: Skin over the face, bilateral upper extremities, and posterior torso is clean, dry, intact without rashes.  --LYMPH NODES: No palpable or tender anterior/posterior cervical, submandibular, or supraclavicular lymph nodes.  --RESPIRATORY: Normal rhythm and effort. No abnormal accessory muscle breathing patterns noted.     --NEUROLOGIC: CN III-XII are grossly intact.   --GROSS MOTOR: Easily arises from a seated position. Toe walking, heel walking, and tandem gait are normal.      --UPPER EXTREMITY MOTOR TESTING:  Shoulder abduction: right " 5/5, left 5/5  Triceps: right 5/5 left 5/5  Biceps:right 5/5  left 5/5,   Wrist flexion: right 5/5  left 5/5,   Wrist extension: right 5/5  left 4/5,   Hand :  right 5/5  left 5/5,   Intrinsics: right 5/5  left 4-/5,   Extensors: right 5/5  left 4-/5,   4-/5 left thumb abduction    --LOWER EXTREMITY MOTOR TESTING  Hip flexion: right 5/5  left 5/5,   Quads:right 5/5  left 5/5,   Hamstrings: right 5/5  left 5/5,   Dorsiflexion: right 5/5  left 5/5,   Plantarflexion: right 5/5  left 5/5,   EHL: right 5/5  left 5/5,     Decreased sensation left arm elbow down into all fingers    Reflexes elijah upper and lower ext 0/4     Negative Clonus, Babinski, and Ramos's bilaterally.      --VASCULAR: Warm upper and lower limbs bilaterally. No swelling or color change noted.    --CERVICAL SPINE: Inspection reveals no evidence of deformity or swelling. No point tenderness to palpation of cervical spine. Positive tenderness to palpation of left trap, left lower paraspinal musculature.    --WRISTS: Full range of motion bilaterally, negative tinel and phalen signs bilaterally.      RESULTS:   Prior medical records from [facility] were reviewed today.         IMAGING:  Spine imaging was personally reviewed and interpreted today. The images were shown to the patient and the findings were explained using a spine model.    EXAM: XR CERVICAL SPINE 2/3 VIEWS  LOCATION: [facility]  DATE: 4/25/2024     INDICATION: Fall, pain.  COMPARISON: None.                                                                      IMPRESSION: Vertebral bodies are only well seen from C2 to C6 on the lateral view. Straightening of the normal cervical lordosis. No obvious loss of vertebral body height. No significant degenerative endplate changes or loss of disc height.    EXAM: XR LUMBAR SPINE 2/3 VIEWS  LOCATION: [facility]  DATE: 4/25/2024     INDICATION: Midline lumbar  pain.  COMPARISON: None.                                                                      IMPRESSION:   Presumed prominent L1 transverse processes. Lumbar spine alignment is within normal limits. No obvious loss of vertebral body height. Mild degenerative endplate changes and loss of disc height at L3-L4          This note was dictated using voice recognition software. Any grammatical or context distortions are unintentional and inherent to the software.       Medina Ruiz FNP-C  [facility]  O. [phone]

## 2024-09-18 ENCOUNTER — MYC MEDICAL ADVICE (OUTPATIENT)
Dept: PHYSICAL MEDICINE AND REHAB | Facility: CLINIC | Age: 29
End: 2024-09-18
Payer: COMMERCIAL

## 2024-09-23 NOTE — TELEPHONE ENCOUNTER
Who's calling:   Patient             Family/Other name:      On C2C: yes or No: N/A       Providers name/other:    MK  Reason for call:  FORMS    Detailed Message: Patient called stating his employer never received his work form that he had dropped off in clinic. Please refax to the number below including both ID's and contact patient to let him know that it has been sent out.   Employer Fax: [phone]  Applicant ID: [id number]  Issue ID: [id number]    Thank you!       Call back #: [phone]  Preferred Pharmacy:

## 2024-09-24 ENCOUNTER — HOSPITAL ENCOUNTER (OUTPATIENT)
Dept: MRI IMAGING | Facility: HOSPITAL | Age: 29
Discharge: HOME | End: 2024-09-24
Attending: NURSE PRACTITIONER | Admitting: NURSE PRACTITIONER
Payer: OTHER MISCELLANEOUS

## 2024-09-24 DIAGNOSIS — M54.12 CERVICAL RADICULOPATHY: ICD-10-CM

## 2024-09-24 PROCEDURE — 72141 MRI NECK SPINE W/O DYE: CPT

## 2024-09-24 NOTE — TELEPHONE ENCOUNTER
Document has been scanned in to chart.   Printed and REfaxed to [phone] per patient request to his employer.

## 2024-09-25 ENCOUNTER — TELEPHONE (OUTPATIENT)
Dept: PHYSICAL MEDICINE AND REHAB | Facility: CLINIC | Age: 29
End: 2024-09-25

## 2024-09-26 ENCOUNTER — OFFICE VISIT (OUTPATIENT)
Dept: PHYSICAL MEDICINE AND REHAB | Facility: CLINIC | Age: 29
End: 2024-09-26
Payer: COMMERCIAL

## 2024-09-26 VITALS
DIASTOLIC BLOOD PRESSURE: 90 MMHG | WEIGHT: 315 LBS | HEART RATE: 99 BPM | OXYGEN SATURATION: 90 % | SYSTOLIC BLOOD PRESSURE: 163 MMHG | HEIGHT: 68 IN | BODY MASS INDEX: 47.74 KG/M2

## 2024-09-26 DIAGNOSIS — M54.12 CERVICAL RADICULOPATHY: Primary | ICD-10-CM

## 2024-09-26 PROCEDURE — 99213 OFFICE O/P EST LOW 20 MIN: CPT | Performed by: NURSE PRACTITIONER

## 2024-09-26 RX ORDER — CYCLOBENZAPRINE HCL 10 MG
10 TABLET ORAL 3 TIMES DAILY PRN
Qty: 45 TABLET | Refills: 0 | Status: SHIPPED | OUTPATIENT
Start: 2024-09-26

## 2024-09-26 RX ORDER — NAPROXEN 500 MG/1
500 TABLET ORAL 2 TIMES DAILY WITH MEALS
Qty: 60 TABLET | Refills: 0 | Status: SHIPPED | OUTPATIENT
Start: 2024-09-26

## 2024-09-26 RX ORDER — PREGABALIN 75 MG/1
75 CAPSULE ORAL 3 TIMES DAILY
Qty: 90 CAPSULE | Refills: 1 | Status: SHIPPED | OUTPATIENT
Start: 2024-09-26

## 2024-09-26 ASSESSMENT — PAIN SCALES - GENERAL: PAINLEVEL: SEVERE PAIN (7)

## 2024-09-26 NOTE — PATIENT INSTRUCTIONS
Refilled lyrica  Refilled flexeril  Refilled naproxen      ~You have been referred for Physical Therapy to [facility]. They will call you to schedule an appointment.      Scheduling phone number is [phone] for [facility], or [place] location.  If you have not heard from the scheduling office within 2 business days, please call [phone] for ALL other locations.    Discussed the importance of core strengthening, ROM, stretching exercises and how each of these entities is important in decreasing pain and improving long term spine health.  The purpose of physical therapy is to teach you an individualized home exercise program.  These exercises need to be performed every day in order to decrease pain and prevent future occurrences of pain.           Radicular Pain    Radicular pain in either the arm or leg is usually from a bulging disc in the spine. A portion of the herniated disc may press against the nerves as the nerves exit the spine. This causes pain which is felt down the arm or leg. Other causes of radicular pain may include:  Fractures.  Heart disease.  Cancer.  An abnormal and usually degenerative state of the nervous system or nerves (neuropathy).    In most cases, radicular pain is treated without imaging unless symptoms do not start to improve. If that is the case, your provider may order a CT or MRI scan to determine the cause.     Nerves in the cervical spine (neck) may cause radicular pain into the outer shoulder and down the arm. It can spread down to the thumb and fingers. The symptoms vary depending on which nerve root has been affected. In most cases, radicular pain improves with conservative treatment such as physical therapy, cervical traction, medications, and epidural steroid injections. A program for neck rehabilitation with stretching and strengthening exercises is an important part of management. Treatment may take months, and  "surgery may be considered as a last resort if the symptoms do not improve.    Nerves in the lumbar spine (lower back) may cause radicular pain into the hip and down the leg. The commonly used term for this type of pain is \"sciatica\". Conservative treatment is also recommended for this problem. Most patients feel better after 2 to 4 weeks of rest and other supportive care. You should avoid bending, lifting, and all other activities which can make your pain worse. Physical therapy, traction, medications, and epidural steroid injections can be good options to help with your recovery. A program for back injury rehabilitation with stretching and strengthening exercises is an important part of management. Surgery may be considered as a last resort if symptoms do not improve with conservative treatment.     You may take over-the-counter or prescription medicines for pain, discomfort, or fever as directed by your caregiver. Muscle relaxants may help by relieving more severe pain and spasm. Neuropathic medication (such as gabapentin, lyrica, or cymbalta) can help decrease your symptoms of nerve pain as well. Cold or massage can also give significant relief. Spinal manipulation is not recommended as it can increase the degree of disc protrusion. We do not recommend taking narcotic medication such as percocet, oxycodone, norco, dilaudid, or others unless pain is severe and not controlled with any other oral options.    Epidural steroid injections are often effective treatment for radicular pain. These injections deliver strong anti-inflammatory medicine to the area directly around the nerve root in the space between your back bones (vertebrae). Your provider can give you more information about steroid injections. These injections are most effective when given within two weeks of the onset of acute pain. You should see your provider for follow up care as recommended.     In most cases, radicular pain is treated without imaging " unless symptoms do not start to improve. If that is the case, your provider may order a CT or MRI scan to determine the cause.     SEEK IMMEDIATE MEDICAL CARE IF:  You develop increased pain, weakness, or numbness in your arm or leg.  You develop difficulty with bladder or bowel control.  You develop abdominal pain.    Document Released: 01/25/2006 Document Revised: 03/11/2013 Document Reviewed: 04/12/2010  Patient Information  2013 [employer].       ~Please call our [facility] Nurse Navigation line [phone] with any questions or concerns about your treatment plan, if symptoms worsen and you would like to be seen urgently, or if you have any new or worsening numbness, weakness, or problems controlling bladder and bowel function.  ~You are also welcome to contact Medina Ruiz via [employer], but please be aware that responses to [employer] message may take 2-3 days due to the high volume of patients seen in clinic.

## 2024-09-26 NOTE — PROGRESS NOTES
ASSESSMENT: Eugenio Rodriguez is a 29 year old male presents for consultation at the request of PCP Clinic, Aminata Lazo, who presents today for follow up  patient evaluation of :     -cervical radiculopathy    Improving numbness/pain, and resolution of weakness in the left arm. We reviewed his cervical MRI. It is quite motion degraded. There is probable mild to moderate spinal canal stenosis at C3-4 with severe elijah foraminal stenosis, this could be contributor to his neck pain. He also has probably moderate to severe bilateral neural foraminal stenosis at C4-5 and severe left foraminal stenosis and moderate to severe right foraminal stenosis at C6-7. Given overall improving symptoms, I recommended continuing current meds, same restrictions, and adding PT. He is not sure what meds he is taking - his wife organizes his meds, but he states he will become more involved in which ones/when he is taking them. I would not make any changes today given we dont know how he is taking these. We talked about risks, benefits, role of a C7-T1 IL JOHN as a next step if symptoms do not improve. Follow up in 4-6 wks            No data to display                     Diagnoses and all orders for this visit:  Cervical radiculopathy  -     pregabalin (LYRICA) 75 MG capsule; Take 1 capsule (75 mg) by mouth 3 times daily.  -     cyclobenzaprine (FLEXERIL) 10 MG tablet; Take 1 tablet (10 mg) by mouth 3 times daily as needed.  -     naproxen (NAPROSYN) 500 MG tablet; Take 1 tablet (500 mg) by mouth 2 times daily (with meals).  -     Physical Therapy  Referral; Future         PLAN:  Reviewed spine anatomy and disease process. Discussed diagnosis and treatment options with the patient today. A shared decision making model was used. The patient's values and choices were respected. The following represents what was discussed and decided upon by the provider and the patient.     -DIAGNOSTIC TESTS:  Images were personally  reviewed and interpreted and explained to patient today using spine model.   -consider open MRI given motion degraded    -PHYSICAL THERAPY:    -start PT, referral placed  Discussed the importance of core strengthening, ROM, stretching exercises with the patient and how each of these entities is important in decreasing pain.  Explained to the patient that the purpose of physical therapy is to teach the patient a home exercise program.  These exercises need to be performed every day in order to decrease pain and prevent future occurrences of pain.    -MEDICATIONS:    -continue lyrica  -continue naproxen  -continue flexeril  Discussed multiple medication options today with patient. Discussed risks, side effects, and proper use of medications. Patient verbalized understanding.    -INTERVENTIONS:    -Discussed the role of injections with patient today. Patient would be a good candidate in the future for either epidural steroid injections or medial branch blocks if indicated based on symptoms and supported by imaging results.    -PATIENT EDUCATION: Total time of 40 minutes, on the day of service, spent with the patient, reviewing the chart, placing orders, and documenting.   -Today we also discussed the issues related to the pros and cons of the current treatment plan.    -FOLLOW-UP:  4-6wks follow up    Advised patient to call the [facility] if symptoms worsen or if they develop red flag symptoms such as numbness, weakness, severe pain uncontrolled by current pain med regimen, or any new or worsening problems controlling bladder and bowel function.   ______________________________________________________________________    SUBJECTIVE:     Eugenio is here for a follow up visit today to review his cervical MRI. He states his left arm pain and numbness is improving. The numbness and pain is now intermittent, depending on what he does instead of constant. Turning his head, sitting in different positions, and activity with  the arm brings the pain on. He has not noticed further weakness. He is taking the medications I prescribed, but does not know which/how he is taking them as his wife sets them up for him and tells him when to take them. He has been waking up at night due to pain. He has been off of work duties, notes again that driving with the axial impact, and need to turn the head often is concerned it will worsen his symptoms. Pain level 7 today, 9 at worst, 5 at best.      Per original visit with updated meds/procedure list:    LOBO    Eugenio MIKHAIL Xavier Rodriguez  is a 29 year old male hx type 2 diabetes, htn  who presents today for new patient evaluation of cervical radiculopathy.    Work injury in March when he fell off of a machine onto his left side. Symptoms started suddenly. Went to ED 4/25  Left sided neck pain into the anterior chest, left arm. into the scap, shoulder, arm, forearm. His has continued. Pain is 8/10 today, 10/10 at worst.  1mo left arm numbness, arm falling asleep.   The gabapentin was helping to some degree, he recently ran out.  Did have side effects on it however.  He tried to go swimming  few days ago, this made symptoms worse. He felt electricity shocks all over his body.    Xr cervical and lumbar spine done in April.  He has not had any physical therapy yet  No chiropractic care, injections, or previous surgeries    -Treatment to Date:     -Medications:  Ibuprofen 800  Flexeril 10  Robaxin 500  Gabapentin  Lyrica  naproxen    Current Outpatient Medications   Medication Sig Dispense Refill    cyclobenzaprine (FLEXERIL) 10 MG tablet Take 1 tablet (10 mg) by mouth 3 times daily as needed. 45 tablet 0    cyclobenzaprine (FLEXERIL) 10 MG tablet Take 1 tablet (10 mg) by mouth 3 times daily as needed. 45 tablet 0    gabapentin (NEURONTIN) 300 MG capsule Take 1 capsule (300 mg) by mouth 3 times daily 30 capsule 0    methocarbamol (ROBAXIN) 500 MG tablet Take 1 tablet (500 mg) by mouth 4 times daily as needed  for muscle spasms 20 tablet 0    naproxen (NAPROSYN) 500 MG tablet Take 1 tablet (500 mg) by mouth 2 times daily (with meals). 60 tablet 0    naproxen (NAPROSYN) 500 MG tablet Take 1 tablet (500 mg) by mouth 2 times daily (with meals). 40 tablet 0    pregabalin (LYRICA) 75 MG capsule Take 1 capsule (75 mg) by mouth 3 times daily. 90 capsule 1    pregabalin (LYRICA) 75 MG capsule Take 75mg once daily x 3 days, then twice daily x 3 days, then three times daily ongoing 90 capsule 1    cyclobenzaprine (FLEXERIL) 10 MG tablet [CYCLOBENZAPRINE (FLEXERIL) 10 MG TABLET] Take 1 tablet (10 mg total) by mouth 3 (three) times a day as needed for muscle spasms. (Patient not taking: Reported on 9/26/2024) 20 tablet 0    ibuprofen (ADVIL,MOTRIN) 800 MG tablet [IBUPROFEN (ADVIL,MOTRIN) 800 MG TABLET] Take 1 tablet (800 mg total) by mouth 3 (three) times a day as needed for pain. (Patient not taking: Reported on 9/26/2024) 21 tablet 0    methylPREDNISolone (MEDROL DOSEPAK) 4 MG tablet therapy pack Follow Package Directions (Patient not taking: Reported on 9/26/2024) 21 tablet 0     No current facility-administered medications for this visit.       No Known Allergies    Past Medical History:   Diagnosis Date    TBI (traumatic brain injury) (H)         Patient Active Problem List   Diagnosis    Conjunctivitis    Acne    Varicella    Infective otitis externa    HTN (hypertension)    Type 2 diabetes mellitus with microalbuminuria (H)       No past surgical history on file.    No family history on file.    Reviewed past medical, surgical, and family history with patient found on new patient intake packet located in EMR Media tab.     SOCIAL HX: sometimes smokes, no alcohol use, no heavy drinking, no rec drug use    Oswestry (IRIS) Questionnaire:         No data to display                Neck Disability Index:       No data to display                   PHQ-2 Score:       8/29/2024     2:51 PM   PHQ-2 ( 1999 [employer])   Q1: Little interest or  "pleasure in doing things 0   Q2: Feeling down, depressed or hopeless 1   PHQ-2 Score 1            OBJECTIVE:  BP (!) 163/90 (BP Location: Left arm, Patient Position: Sitting, Cuff Size: Adult Large)   Pulse 99   Ht 5' 8\" (1.727 m)   Wt (!) 390 lb (176.9 kg)   SpO2 90%   BMI 59.30 kg/m      PHYSICAL EXAMINATION:  --CONSTITUTIONAL: Vital signs as above. No acute distress. The patient is well nourished and well groomed.  --PSYCHIATRIC: The patient is awake, alert, oriented to person, place, and time, and answering questions appropriately with clear speech. Appropriate mood and affect   --HEENT: Sclera are non-injected. Extraocular muscles are intact. Moist oral mucosa.  --SKIN: Skin over the face, bilateral upper extremities, and posterior torso is clean, dry, intact without rashes.  --RESPIRATORY: Normal rhythm and effort. No abnormal accessory muscle breathing patterns noted.     --NEUROLOGIC: CN III-XII are grossly intact.   --GROSS MOTOR: Easily arises from a seated position.     --UPPER EXTREMITY MOTOR TESTING:  Shoulder abduction: right 5/5, left 5/5  Triceps: right 5/5 left 5/5  Biceps:right 5/5  left 5/5,   Wrist flexion: right 5/5  left 5/5,   Wrist extension: right 5/5  left 5/5,   Hand :  right 5/5  left 5/5,   Intrinsics: right 5/5  left 5-/5,   Extensors: right 5/5  left 5-/5,   4-/5 left thumb abduction    --LOWER EXTREMITY MOTOR TESTING  Hip flexion: right 5/5  left 5/5,   Quads:right 5/5  left 5/5,   Hamstrings: right 5/5  left 5/5,   Dorsiflexion: right 5/5  left 5/5,   Plantarflexion: right 5/5  left 5/5,   EHL: right 5/5  left 5/5,     Decreased sensation left upper arm, forearm, and all fingers    --VASCULAR: Warm upper and lower limbs bilaterally. No swelling or color change noted.    --CERVICAL SPINE: Inspection reveals no evidence of deformity or swelling. No point tenderness to palpation of cervical spine. Positive tenderness to palpation of left trap, left lower paraspinal " musculature.        RESULTS:   Prior medical records from [facility] and Care Everywhere were reviewed today.         IMAGING:  Spine imaging was personally reviewed and interpreted today. The images were shown to the patient and the findings were explained using a spine model.    EXAM: MR CERVICAL SPINE W/O CONTRAST  LOCATION: [facility]  DATE: 9/24/2024     INDICATION: cervical radiculopathy, left arm weakness and numbness since trauma in March, xr not revealing; Neck pain; Neurologic deficit, non traumatic; UE weakness; No known automatically detected potential contraindications to imaging  COMPARISON: None.  TECHNIQUE: MRI Cervical Spine without IV contrast.     FINDINGS:   Significantly motion degraded exam. Straightening of the normal cervical lordosis. Vertebral body heights are maintained. No pathologic marrow signal abnormality. No gross cord signal abnormality allowing for motion degradation. No extraspinal   abnormality.     Craniovertebral junction and C1-C2: Normal.     C2-C3: Normal disc height. No herniation. Normal facets. No spinal canal or neural foraminal stenosis.      C3-C4: Normal disc height. No shallow disc bulge is seen on axial sequences. Probable mild to moderate spinal canal stenosis and severe bilateral foraminal stenosis..      C4-C5: Normal disc height. No herniation. Normal facets. No spinal canal stenosis. Probable moderate to severe bilateral neural foraminal stenosis..      C5-C6: Normal disc height. No herniation. Normal facets. No spinal canal stenosis. No significant right foraminal narrowing. Probable mild left foraminal stenosis..      C6-C7: Normal disc height. No herniation. Normal facets. No spinal canal stenosis. Probable moderate to severe right and severe left foraminal stenosis..      C7-T1: Normal disc height. No herniation. Normal facets. No spinal canal or neural foraminal stenosis.                                                                       IMPRESSION:  1.  Significantly motion degraded exam.  2.  No severe spinal canal stenosis.  3.  Probable mild to moderate spinal canal stenosis C3-4.  4.  There are moderate to severe and severe foraminal stenoses of the C3-4 through C6-7 levels detailed above.       EXAM: XR CERVICAL SPINE 2/3 VIEWS  LOCATION: [facility]  DATE: 4/25/2024     INDICATION: Fall, pain.  COMPARISON: None.                                                                      IMPRESSION: Vertebral bodies are only well seen from C2 to C6 on the lateral view. Straightening of the normal cervical lordosis. No obvious loss of vertebral body height. No significant degenerative endplate changes or loss of disc height.          This note was dictated using voice recognition software. Any grammatical or context distortions are unintentional and inherent to the software.       Medina STEARNS-C  [facility]  O. [phone]

## 2024-09-26 NOTE — LETTER
September 26, 2024      Eugenio Rodriguez  [address] [address]        To Whom It May Concern:    Eugenio Rodriguez was seen in our clinic. He may return to work with the following restrictions. No lifting, pushing, pulling, bending, twisting, or driving until follow up appointment in 4-6wks. 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 Please feel free to contact our office with questions or concerns       Sincerely,      Medina SIERRA  [facility]  O. [phone]

## 2024-09-26 NOTE — LETTER
9/26/2024      Eugenio Rodriguez  [address]  [place] [address]      Dear Colleague,    Thank you for referring your patient, Eugenio Rodriguez, to the [facility]. Please see a copy of my visit note below.    ASSESSMENT: Eugenio Rodriguez is a 29 year old male presents for consultation at the request of PCP Clinic, Aminata Lazo, who presents today for follow up  patient evaluation of :     -cervical radiculopathy    Improving numbness/pain, and resolution of weakness in the left arm. We reviewed his cervical MRI. It is quite motion degraded. There is probable mild to moderate spinal canal stenosis at C3-4 with severe elijah foraminal stenosis, this could be contributor to his neck pain. He also has probably moderate to severe bilateral neural foraminal stenosis at C4-5 and severe left foraminal stenosis and moderate to severe right foraminal stenosis at C6-7. Given overall improving symptoms, I recommended continuing current meds, same restrictions, and adding PT. He is not sure what meds he is taking - his wife organizes his meds, but he states he will become more involved in which ones/when he is taking them. I would not make any changes today given we dont know how he is taking these. We talked about risks, benefits, role of a C7-T1 IL JOHN as a next step if symptoms do not improve. Follow up in 4-6 wks            No data to display                     Diagnoses and all orders for this visit:  Cervical radiculopathy  -     pregabalin (LYRICA) 75 MG capsule; Take 1 capsule (75 mg) by mouth 3 times daily.  -     cyclobenzaprine (FLEXERIL) 10 MG tablet; Take 1 tablet (10 mg) by mouth 3 times daily as needed.  -     naproxen (NAPROSYN) 500 MG tablet; Take 1 tablet (500 mg) by mouth 2 times daily (with meals).  -     Physical Therapy  Referral; Future         PLAN:  Reviewed spine anatomy and disease process. Discussed diagnosis and treatment  options with the patient today. A shared decision making model was used. The patient's values and choices were respected. The following represents what was discussed and decided upon by the provider and the patient.     -DIAGNOSTIC TESTS:  Images were personally reviewed and interpreted and explained to patient today using spine model.   -consider open MRI given motion degraded    -PHYSICAL THERAPY:    -start PT, referral placed  Discussed the importance of core strengthening, ROM, stretching exercises with the patient and how each of these entities is important in decreasing pain.  Explained to the patient that the purpose of physical therapy is to teach the patient a home exercise program.  These exercises need to be performed every day in order to decrease pain and prevent future occurrences of pain.    -MEDICATIONS:    -continue lyrica  -continue naproxen  -continue flexeril  Discussed multiple medication options today with patient. Discussed risks, side effects, and proper use of medications. Patient verbalized understanding.    -INTERVENTIONS:    -Discussed the role of injections with patient today. Patient would be a good candidate in the future for either epidural steroid injections or medial branch blocks if indicated based on symptoms and supported by imaging results.    -PATIENT EDUCATION: Total time of 40 minutes, on the day of service, spent with the patient, reviewing the chart, placing orders, and documenting.   -Today we also discussed the issues related to the pros and cons of the current treatment plan.    -FOLLOW-UP:  4-6wks follow up    Advised patient to call the [facility] Center if symptoms worsen or if they develop red flag symptoms such as numbness, weakness, severe pain uncontrolled by current pain med regimen, or any new or worsening problems controlling bladder and bowel function.   ______________________________________________________________________    SUBJECTIVE:     Eugenio is here for a  follow up visit today to review his cervical MRI. He states his left arm pain and numbness is improving. The numbness and pain is now intermittent, depending on what he does instead of constant. Turning his head, sitting in different positions, and activity with the arm brings the pain on. He has not noticed further weakness. He is taking the medications I prescribed, but does not know which/how he is taking them as his wife sets them up for him and tells him when to take them. He has been waking up at night due to pain. He has been off of work duties, notes again that driving with the axial impact, and need to turn the head often is concerned it will worsen his symptoms.      Per original visit with updated meds/procedure list:    LOBO    Eugenio MIKHAIL AponteXaviererika Rodriguez  is a 29 year old male hx type 2 diabetes, htn  who presents today for new patient evaluation of cervical radiculopathy.    Work injury in March when he fell off of a machine onto his left side. Symptoms started suddenly. Went to ED 4/25  Left sided neck pain into the anterior chest, left arm. into the scap, shoulder, arm, forearm. His has continued. Pain is 8/10 today, 10/10 at worst.  1mo left arm numbness, arm falling asleep.   The gabapentin was helping to some degree, he recently ran out.  Did have side effects on it however.  He tried to go swimming  few days ago, this made symptoms worse. He felt electricity shocks all over his body.    Xr cervical and lumbar spine done in April.  He has not had any physical therapy yet  No chiropractic care, injections, or previous surgeries    -Treatment to Date:     -Medications:  Ibuprofen 800  Flexeril 10  Robaxin 500  Gabapentin  Lyrica  naproxen    Current Outpatient Medications   Medication Sig Dispense Refill     cyclobenzaprine (FLEXERIL) 10 MG tablet Take 1 tablet (10 mg) by mouth 3 times daily as needed. 45 tablet 0     cyclobenzaprine (FLEXERIL) 10 MG tablet Take 1 tablet (10 mg) by mouth 3 times daily  as needed. 45 tablet 0     gabapentin (NEURONTIN) 300 MG capsule Take 1 capsule (300 mg) by mouth 3 times daily 30 capsule 0     methocarbamol (ROBAXIN) 500 MG tablet Take 1 tablet (500 mg) by mouth 4 times daily as needed for muscle spasms 20 tablet 0     naproxen (NAPROSYN) 500 MG tablet Take 1 tablet (500 mg) by mouth 2 times daily (with meals). 60 tablet 0     naproxen (NAPROSYN) 500 MG tablet Take 1 tablet (500 mg) by mouth 2 times daily (with meals). 40 tablet 0     pregabalin (LYRICA) 75 MG capsule Take 1 capsule (75 mg) by mouth 3 times daily. 90 capsule 1     pregabalin (LYRICA) 75 MG capsule Take 75mg once daily x 3 days, then twice daily x 3 days, then three times daily ongoing 90 capsule 1     cyclobenzaprine (FLEXERIL) 10 MG tablet [CYCLOBENZAPRINE (FLEXERIL) 10 MG TABLET] Take 1 tablet (10 mg total) by mouth 3 (three) times a day as needed for muscle spasms. (Patient not taking: Reported on 9/26/2024) 20 tablet 0     ibuprofen (ADVIL,MOTRIN) 800 MG tablet [IBUPROFEN (ADVIL,MOTRIN) 800 MG TABLET] Take 1 tablet (800 mg total) by mouth 3 (three) times a day as needed for pain. (Patient not taking: Reported on 9/26/2024) 21 tablet 0     methylPREDNISolone (MEDROL DOSEPAK) 4 MG tablet therapy pack Follow Package Directions (Patient not taking: Reported on 9/26/2024) 21 tablet 0     No current facility-administered medications for this visit.       No Known Allergies    Past Medical History:   Diagnosis Date     TBI (traumatic brain injury) (H)         Patient Active Problem List   Diagnosis     Conjunctivitis     Acne     Varicella     Infective otitis externa     HTN (hypertension)     Type 2 diabetes mellitus with microalbuminuria (H)       No past surgical history on file.    No family history on file.    Reviewed past medical, surgical, and family history with patient found on new patient intake packet located in EMR Media tab.     SOCIAL HX: sometimes smokes, no alcohol use, no heavy drinking, no rec  "drug use    Oswestry (IRIS) Questionnaire:         No data to display                Neck Disability Index:       No data to display                   PHQ-2 Score:       8/29/2024     2:51 PM   PHQ-2 ( 1999 [employer])   Q1: Little interest or pleasure in doing things 0   Q2: Feeling down, depressed or hopeless 1   PHQ-2 Score 1            OBJECTIVE:  BP (!) 163/90 (BP Location: Left arm, Patient Position: Sitting, Cuff Size: Adult Large)   Pulse 99   Ht 5' 8\" (1.727 m)   Wt (!) 390 lb (176.9 kg)   SpO2 90%   BMI 59.30 kg/m      PHYSICAL EXAMINATION:  --CONSTITUTIONAL: Vital signs as above. No acute distress. The patient is well nourished and well groomed.  --PSYCHIATRIC: The patient is awake, alert, oriented to person, place, and time, and answering questions appropriately with clear speech. Appropriate mood and affect   --HEENT: Sclera are non-injected. Extraocular muscles are intact. Moist oral mucosa.  --SKIN: Skin over the face, bilateral upper extremities, and posterior torso is clean, dry, intact without rashes.  --RESPIRATORY: Normal rhythm and effort. No abnormal accessory muscle breathing patterns noted.     --NEUROLOGIC: CN III-XII are grossly intact.   --GROSS MOTOR: Easily arises from a seated position.     --UPPER EXTREMITY MOTOR TESTING:  Shoulder abduction: right 5/5, left 5/5  Triceps: right 5/5 left 5/5  Biceps:right 5/5  left 5/5,   Wrist flexion: right 5/5  left 5/5,   Wrist extension: right 5/5  left 5/5,   Hand :  right 5/5  left 5/5,   Intrinsics: right 5/5  left 5-/5,   Extensors: right 5/5  left 5-/5,   4-/5 left thumb abduction    --LOWER EXTREMITY MOTOR TESTING  Hip flexion: right 5/5  left 5/5,   Quads:right 5/5  left 5/5,   Hamstrings: right 5/5  left 5/5,   Dorsiflexion: right 5/5  left 5/5,   Plantarflexion: right 5/5  left 5/5,   EHL: right 5/5  left 5/5,     Decreased sensation left upper arm, forearm, and all fingers    --VASCULAR: Warm upper and lower limbs bilaterally. No " swelling or color change noted.    --CERVICAL SPINE: Inspection reveals no evidence of deformity or swelling. No point tenderness to palpation of cervical spine. Positive tenderness to palpation of left trap, left lower paraspinal musculature.        RESULTS:   Prior medical records from [facility] Everywhere were reviewed today.         IMAGING:  Spine imaging was personally reviewed and interpreted today. The images were shown to the patient and the findings were explained using a spine model.    EXAM: MR CERVICAL SPINE W/O CONTRAST  LOCATION: [facility]  DATE: 9/24/2024     INDICATION: cervical radiculopathy, left arm weakness and numbness since trauma in March, xr not revealing; Neck pain; Neurologic deficit, non traumatic; UE weakness; No known automatically detected potential contraindications to imaging  COMPARISON: None.  TECHNIQUE: MRI Cervical Spine without IV contrast.     FINDINGS:   Significantly motion degraded exam. Straightening of the normal cervical lordosis. Vertebral body heights are maintained. No pathologic marrow signal abnormality. No gross cord signal abnormality allowing for motion degradation. No extraspinal   abnormality.     Craniovertebral junction and C1-C2: Normal.     C2-C3: Normal disc height. No herniation. Normal facets. No spinal canal or neural foraminal stenosis.      C3-C4: Normal disc height. No shallow disc bulge is seen on axial sequences. Probable mild to moderate spinal canal stenosis and severe bilateral foraminal stenosis..      C4-C5: Normal disc height. No herniation. Normal facets. No spinal canal stenosis. Probable moderate to severe bilateral neural foraminal stenosis..      C5-C6: Normal disc height. No herniation. Normal facets. No spinal canal stenosis. No significant right foraminal narrowing. Probable mild left foraminal stenosis..      C6-C7: Normal disc height. No herniation. Normal facets. No spinal canal stenosis.  Probable moderate to severe right and severe left foraminal stenosis..      C7-T1: Normal disc height. No herniation. Normal facets. No spinal canal or neural foraminal stenosis.                                                                      IMPRESSION:  1.  Significantly motion degraded exam.  2.  No severe spinal canal stenosis.  3.  Probable mild to moderate spinal canal stenosis C3-4.  4.  There are moderate to severe and severe foraminal stenoses of the C3-4 through C6-7 levels detailed above.       EXAM: XR CERVICAL SPINE 2/3 VIEWS  LOCATION: [facility]  DATE: 4/25/2024     INDICATION: Fall, pain.  COMPARISON: None.                                                                      IMPRESSION: Vertebral bodies are only well seen from C2 to C6 on the lateral view. Straightening of the normal cervical lordosis. No obvious loss of vertebral body height. No significant degenerative endplate changes or loss of disc height.          This note was dictated using voice recognition software. Any grammatical or context distortions are unintentional and inherent to the software.       Medina DIANAP-C  [facility]  O. [phone]      Again, thank you for allowing me to participate in the care of your patient.        Sincerely,        Medina Ruiz, AN CNP

## 2024-10-03 ENCOUNTER — THERAPY VISIT (OUTPATIENT)
Dept: PHYSICAL THERAPY | Facility: REHABILITATION | Age: 29
End: 2024-10-03
Attending: NURSE PRACTITIONER
Payer: OTHER MISCELLANEOUS

## 2024-10-03 DIAGNOSIS — M54.12 CERVICAL RADICULOPATHY: ICD-10-CM

## 2024-10-03 PROCEDURE — 97110 THERAPEUTIC EXERCISES: CPT | Mod: GP | Performed by: PHYSICAL THERAPIST

## 2024-10-03 PROCEDURE — 97161 PT EVAL LOW COMPLEX 20 MIN: CPT | Mod: GP | Performed by: PHYSICAL THERAPIST

## 2024-10-03 NOTE — PROGRESS NOTES
PHYSICAL THERAPY EVALUATION  Type of Visit: Evaluation       Fall Risk Screen:  Fall screen completed by: PT  Have you fallen 2 or more times in the past year?: No  Have you fallen and had an injury in the past year?: No  Is patient a fall risk?: No    Subjective       Presenting condition or subjective complaint: neck pain  Date of onset: 04/25/24    Relevant medical history:     Dates & types of surgery:      Prior diagnostic imaging/testing results: MRI     Prior therapy history for the same diagnosis, illness or injury:        Prior Level of Function  Transfers:   Ambulation:   ADL:   IADL:     Living Environment  Social support: With a significant other or spouse   Type of home: Basement   Stairs to enter the home: No       Ramp: Yes   Stairs inside the home: Yes       Help at home: Other  Equipment owned:       Employment: Yes   Hobbies/Interests:     and fell from [employer].  Landed on left side and twisted to the right.  Started feeling pain 2-3 weeks later.  April .  Then went to the emergency room on 4/25/2024 with the neck pain and arm pain.  Have had some shots no change with that have tried medications.  Pain kept getting worse and didn't get to PT.  Pain is progressively getting worse.  Was only sleeping 2 hours per day due to pain.  Then went back to ER.  Went to neurology.  Left side is weak.  MRI   Patient goals for therapy: move my head and work with my shoulder  So pain is on the left side of the neck into the upper trap into the anterior chest into the shoulder blade and it goes down the side of the arm and also get numbness and tingling into the lower arm and hand posterior arm and hand.  So pain with sitting up straight cervical flexion and turning to the left.  Increased pain at end range.  Went to the emergency room 4/25/2024 with the neck pain and arm pain  Pain assessment:    Average pain 7/10.  For day and week.    Not doing activities right now due to pain.  Restrictions no  bending no pulling no twisting no lifting no driving. Have been off work since Aug 15.  Will go back to MD 4-6 weeks.    7 out of 10 pain the back of the neck off to the left-hand side upper trap the paraspinals next to the spine and closer to the scapula on the backside. the front of the shoulder pectoralis mm  in the collarbone on the left.  Numbness posterior forearm 4/10.    Objective   CERVICAL SPINE EVALUATION  PAIN: Pain Level at Rest: 7/10  Pain Level with Use: 9/10  Pain Location:   Pain Quality: Numb, Stabbing, and Tingling  Pain Frequency: constant  Pain is Worst: same all day  Pain is Exacerbated By: using left arm reaching and stretching just lifting is fine.  Cervical flexion extension and left rotation. OK to drive using right hand. Short distances.      Pain is Relieved By: shoulder circles help medications  T2 right rotated. General listen anterior left upper cervical spine.    In sitting T2 neutral   left shoulder flexion 102 degrees increases pain posterior shoulder  left shoulder abd 124 degrees pain in the neck   INTEGUMENTARY (edema, incisions):   POSTURE:   GAIT:   Weightbearing Status:   Assistive Device(s):   Gait Deviations:   BALANCE/PROPRIOCEPTION:   WEIGHTBEARING ALIGNMENT:   ROM:   (Degrees) Left AROM Right AROM    Cervical Flexion 4 fingers to the chest and decreased symptoms in the forearm increased pain in the lateral left neck and upper back:    Cervical Extension 9 degrees with sharp pain anterior and posterior chest on the left and pain into the forearm on the left.    Cervical Side bend      Cervical Rotation Left rotation 40 degrees and sharp pain front and back of the chest no change in arm symptoms Right rotation 50 degrees no change in symptoms    Cervical Protrusion     Cervical Retraction     Thoracic Flexion     Thoracic Extension     Thoracic Rotation       Left AROM Left PROM Right AROM Right PROM   Shoulder Flexion       Shoulder Extension       Shoulder Abduction        Shoulder Adduction       Shoulder IR       Shoulder ER       Shoulder Horiz Abduction       Shoulder Horiz Adduction       Pain:   End Feel:     MYOTOMES:   DTR S:   CORD SIGNS:   DERMATOMES:   NEURAL TENSION:   FLEXIBILITY:    SPECIAL TESTS:   PALPATION:   SPINAL SEGMENTAL CONCLUSIONS:       Assessment & Plan   CLINICAL IMPRESSIONS  Medical Diagnosis: M54.12 (ICD-10-CM) - Cervical radiculopathy    Treatment Diagnosis: Cervical radiculopathy   Impression/Assessment: Patient is a 29 year old male with Pain is on the left side of the neck into the upper trap into the anterior chest into the shoulder blade and it goes down the side of the arm and also get numbness and tingling into the lower arm and hand posterior arm and hand.   7 out of 10 pain the back of the neck off to the left-hand side upper trap the paraspinals next to the spine and closer to the scapula on the backside. the front of the shoulder pectoralis mm  in the collarbone on the left.  Numbness posterior forearm 4/10.  complaints.  The following significant findings have been identifiedpain with sitting up straight cervical flexion and turning to the left.  Increased pain at end range. : Pain, Decreased ROM/flexibility, Decreased joint mobility, Decreased strength, Impaired muscle performance, Decreased activity tolerance, and Impaired posture. These impairments interfere with their ability to perform self care tasks, work tasks, recreational activities, household chores, and driving  as compared to previous level of function.     Clinical Decision Making (Complexity):  Clinical Presentation: Stable/Uncomplicated  Clinical Presentation Rationale: based on medical and personal factors listed in PT evaluation  Clinical Decision Making (Complexity): Low complexity    PLAN OF CARE  Treatment Interventions:  Modalities: Cryotherapy, E-stim, Hot Pack, Ultrasound, check precautions before doing any modalities.  Interventions: Manual Therapy, Neuromuscular  Re-education, Therapeutic Activity, Therapeutic Exercise, Self-Care/Home Management, manual neural mobilization    Long Term Goals     PT Goal 1  Goal Identifier: HEP  Goal Description: The patient will demonstrate independence in home exercise program to aid in home management of symptoms.  Rationale: to maximize safety and independence with performance of ADLs and functional tasks;to maximize safety and independence with self cares  Target Date: 12/26/24  PT Goal 2  Goal Identifier: Increase active shoulder flexion and abduction with 2 with in the normal limits without increase in pain.  Goal Description: Increase active shoulder flexion and abduction with 2 with in the normal limits without increase in pain.  Rationale: to maximize safety and independence with performance of ADLs and functional tasks;to maximize safety and independence within the home;to maximize safety and independence within the community;to maximize safety and independence with transportation;to maximize safety and independence with self cares  Target Date: 12/26/24  PT Goal 3  Goal Identifier: Patient will increase cervical spine range of motion by 5 to 10 degrees with no pain so that rotation range of motion is equal and is able to drive without increasing pain  Goal Description: Patient will increase cervical spine range of motion by 5 to 10 degrees with no pain so that rotation range of motion is equal and is able to drive without increasing pain  Rationale: to maximize safety and independence with performance of ADLs and functional tasks;to maximize safety and independence with transportation  Target Date: 12/26/24      Frequency of Treatment: 1 to 2 time per week  Duration of Treatment: 12 weeks    Recommended Referrals to Other Professionals:   Education Assessment:   Learner/Method: Patient    Risks and benefits of evaluation/treatment have been explained.   Patient/Family/caregiver agrees with Plan of Care.     Evaluation Time:     PT  Shlomo, Low Complexity Minutes (13127): 20       Signing Clinician: Carly Rojo PT

## 2024-10-05 ENCOUNTER — HEALTH MAINTENANCE LETTER (OUTPATIENT)
Age: 29
End: 2024-10-05

## 2024-10-28 DIAGNOSIS — M54.12 CERVICAL RADICULOPATHY: ICD-10-CM

## 2024-10-29 RX ORDER — NAPROXEN 500 MG/1
500 TABLET ORAL 2 TIMES DAILY WITH MEALS
Qty: 60 TABLET | Refills: 0 | Status: SHIPPED | OUTPATIENT
Start: 2024-10-29

## 2024-11-05 ENCOUNTER — THERAPY VISIT (OUTPATIENT)
Dept: PHYSICAL THERAPY | Facility: REHABILITATION | Age: 29
End: 2024-11-05
Payer: COMMERCIAL

## 2024-11-05 DIAGNOSIS — M54.12 CERVICAL RADICULOPATHY: Primary | ICD-10-CM

## 2024-11-05 PROCEDURE — 97110 THERAPEUTIC EXERCISES: CPT | Mod: GP | Performed by: PHYSICAL THERAPIST

## 2024-11-07 ENCOUNTER — OFFICE VISIT (OUTPATIENT)
Dept: PHYSICAL MEDICINE AND REHAB | Facility: CLINIC | Age: 29
End: 2024-11-07
Payer: OTHER MISCELLANEOUS

## 2024-11-07 VITALS — HEART RATE: 107 BPM | DIASTOLIC BLOOD PRESSURE: 81 MMHG | SYSTOLIC BLOOD PRESSURE: 170 MMHG

## 2024-11-07 DIAGNOSIS — M54.12 CERVICAL RADICULOPATHY: Primary | ICD-10-CM

## 2024-11-07 PROCEDURE — 99213 OFFICE O/P EST LOW 20 MIN: CPT | Performed by: NURSE PRACTITIONER

## 2024-11-07 RX ORDER — CYCLOBENZAPRINE HCL 10 MG
10 TABLET ORAL 3 TIMES DAILY PRN
Qty: 45 TABLET | Refills: 0 | Status: SHIPPED | OUTPATIENT
Start: 2024-11-07

## 2024-11-07 ASSESSMENT — PAIN SCALES - GENERAL: PAINLEVEL_OUTOF10: MODERATE PAIN (4)

## 2024-11-07 NOTE — PROGRESS NOTES
ASSESSMENT: Eugenio Rodriguez is a 29 year old male presents for consultation at the request of PCP Clinic, Aminata Lazo, who presents today for follow up  patient evaluation of :     -cervical radiculopathy    Resolution of left arm pain, numbness, and weakness. Ongoing left sided neck pain, sharp with head position. He has done 2 sessions of PT. I suggested resuming naproxen and flexeril (refilled), contining current restrictions and follow up in 1 mo after some additional PT sessions to focus on lifting/reaching, which he must do for his job. If pain continues to improve, I think we can release all restrictions next time.           No data to display                     Diagnoses and all orders for this visit:  Cervical radiculopathy  -     cyclobenzaprine (FLEXERIL) 10 MG tablet; Take 1 tablet (10 mg) by mouth 3 times daily as needed for muscle spasms.           PLAN:  Reviewed spine anatomy and disease process. Discussed diagnosis and treatment options with the patient today. A shared decision making model was used. The patient's values and choices were respected. The following represents what was discussed and decided upon by the provider and the patient.     -DIAGNOSTIC TESTS:  Images were personally reviewed and interpreted and explained to patient today using spine model.   -consider repeating MRI if needed given motion degraded quality    -PHYSICAL THERAPY:    -continue PT  Discussed the importance of core strengthening, ROM, stretching exercises with the patient and how each of these entities is important in decreasing pain.  Explained to the patient that the purpose of physical therapy is to teach the patient a home exercise program.  These exercises need to be performed every day in order to decrease pain and prevent future occurrences of pain.    -MEDICATIONS:    -off of the lyrica  -has naproxen, would suggest starting  -continue flexeril 3x daily as needed   Discussed multiple medication  options today with patient. Discussed risks, side effects, and proper use of medications. Patient verbalized understanding.    -INTERVENTIONS:    -Discussed the role of injections with patient today. Patient would be a good candidate in the future for either epidural steroid injections or medial branch blocks if indicated based on symptoms and supported by imaging results.    -PATIENT EDUCATION: Total time of 20 minutes, on the day of service, spent with the patient, reviewing the chart, placing orders, and documenting.   -Today we also discussed the issues related to the pros and cons of the current treatment plan.    -FOLLOW-UP:  4-6wks follow up    Advised patient to call the [facility] if symptoms worsen or if they develop red flag symptoms such as numbness, weakness, severe pain uncontrolled by current pain med regimen, or any new or worsening problems controlling bladder and bowel function.   ______________________________________________________________________    SUBJECTIVE:     Resolution of left arm pain, numbness, and weakness. Ongoing left sided neck pain, sharp with head position. Pain level between a 4-6/10. He has done 2 sessions of PT. He is not taking any pain medications. Stopped the lyrica as well.       Per original visit with updated meds/procedure list:    LOBO    Eugenio Rodriguez  is a 29 year old male hx type 2 diabetes, htn  who presents today for new patient evaluation of cervical radiculopathy.    Work injury in March when he fell off of a machine onto his left side. Symptoms started suddenly. Went to ED 4/25  Left sided neck pain into the anterior chest, left arm. into the scap, shoulder, arm, forearm. His has continued. Pain is 8/10 today, 10/10 at worst.  1mo left arm numbness, arm falling asleep.   The gabapentin was helping to some degree, he recently ran out.  Did have side effects on it however.  He tried to go swimming  few days ago, this made symptoms worse. He felt  electricity shocks all over his body.    Xr cervical and lumbar spine done in April.  He has not had any physical therapy yet  No chiropractic care, injections, or previous surgeries    -Treatment to Date:     -Medications:  Ibuprofen 800  Flexeril 10  Robaxin 500  Gabapentin  Lyrica  naproxen    Current Outpatient Medications   Medication Sig Dispense Refill    cyclobenzaprine (FLEXERIL) 10 MG tablet Take 1 tablet (10 mg) by mouth 3 times daily as needed for muscle spasms. 45 tablet 0    ibuprofen (ADVIL,MOTRIN) 800 MG tablet [IBUPROFEN (ADVIL,MOTRIN) 800 MG TABLET] Take 1 tablet (800 mg total) by mouth 3 (three) times a day as needed for pain. 21 tablet 0    naproxen (NAPROSYN) 500 MG tablet Take 1 tablet (500 mg) by mouth 2 times daily (with meals). 60 tablet 0    cyclobenzaprine (FLEXERIL) 10 MG tablet Take 1 tablet (10 mg) by mouth 3 times daily as needed. (Patient not taking: Reported on 11/7/2024) 45 tablet 0    gabapentin (NEURONTIN) 300 MG capsule Take 1 capsule (300 mg) by mouth 3 times daily (Patient not taking: Reported on 11/7/2024) 30 capsule 0    methocarbamol (ROBAXIN) 500 MG tablet Take 1 tablet (500 mg) by mouth 4 times daily as needed for muscle spasms (Patient not taking: Reported on 11/7/2024) 20 tablet 0    methylPREDNISolone (MEDROL DOSEPAK) 4 MG tablet therapy pack Follow Package Directions (Patient not taking: Reported on 11/7/2024) 21 tablet 0    pregabalin (LYRICA) 75 MG capsule Take 1 capsule (75 mg) by mouth 3 times daily. (Patient not taking: Reported on 11/7/2024) 90 capsule 1    pregabalin (LYRICA) 75 MG capsule Take 75mg once daily x 3 days, then twice daily x 3 days, then three times daily ongoing (Patient not taking: Reported on 11/7/2024) 90 capsule 1     No current facility-administered medications for this visit.       No Known Allergies    Past Medical History:   Diagnosis Date    TBI (traumatic brain injury) (H)         Patient Active Problem List   Diagnosis    Conjunctivitis     Acne    Varicella    Infective otitis externa    HTN (hypertension)    Type 2 diabetes mellitus with microalbuminuria (H)       No past surgical history on file.    No family history on file.    Reviewed past medical, surgical, and family history with patient found on new patient intake packet located in [employer]MR [employer] tab.     SOCIAL HX: sometimes smokes, no alcohol use, no heavy drinking, no rec drug use    Oswestry (IRIS) Questionnaire:         No data to display                Neck Disability Index:      10/3/2024     9:01 AM   Neck Disability Index (  Christian WILLINGHAM. and Jennifer KENNY. 1991. All rights reserved.; used with permission)   SECTION 1 - PAIN INTENSITY 1    SECTION 3 - LIFTING 4    SECTION 4 - READING 2    SECTION 6 - CONCENTRATION 1    SECTION 7 - WORK 3    SECTION 9 - SLEEPING 4    SECTION 10 - RECREATION 4    Count 7   Sum 19       Patient-reported          PHQ-2 Score:       8/29/2024     2:51 PM   PHQ-2 ( 1999 [employer])   Q1: Little interest or pleasure in doing things 0   Q2: Feeling down, depressed or hopeless 1   PHQ-2 Score 1            OBJECTIVE:  BP (!) 170/81   Pulse 107     PHYSICAL EXAMINATION:  --CONSTITUTIONAL: Vital signs as above. No acute distress. The patient is well nourished and well groomed.  --PSYCHIATRIC: The patient is awake, alert, oriented to person, place, and time, and answering questions appropriately with clear speech. Appropriate mood and affect   --HEENT: Sclera are non-injected. Extraocular muscles are intact. Moist oral mucosa.  --SKIN: Skin over the face is clean, dry, intact without rashes.  --RESPIRATORY: Normal rhythm and effort. No abnormal accessory muscle breathing patterns noted.     --NEUROLOGIC: CN III-XII are grossly intact.   --GROSS MOTOR: Easily arises from a seated position.     --UPPER EXTREMITY MOTOR TESTING:  Shoulder abduction: right 5/5, left 5/5  Triceps: right 5/5 left 5/5  Biceps:right 5/5  left 5/5,   Hand :  right 5/5  left 5/5,   Intrinsics: right  5/5  left 5/5,   Extensors: right 5/5  left 5/5,     --LOWER EXTREMITY MOTOR TESTING  Hip flexion: right 5/5  left 5/5,   Quads:right 5/5  left 5/5,   Hamstrings: right 5/5  left 5/5,   Dorsiflexion: right 5/5  left 5/5,   Plantarflexion: right 5/5  left 5/5,   EHL: right 5/5  left 5/5,     Decreased sensation left upper arm, forearm, and all fingers    --VASCULAR: Warm upper and lower limbs bilaterally. No swelling or color change noted.    --CERVICAL SPINE: Inspection reveals no evidence of deformity or swelling. No point tenderness to palpation of cervical spine. Positive tenderness to palpation of left trap, left lower paraspinal musculature.      RESULTS:   Prior medical records from [facility] Everywhere were reviewed today.         IMAGING:  Spine imaging was personally reviewed and interpreted today. The images were shown to the patient and the findings were explained using a spine model.    EXAM: MR CERVICAL SPINE W/O CONTRAST  LOCATION: [facility]  DATE: 9/24/2024     INDICATION: cervical radiculopathy, left arm weakness and numbness since trauma in March, xr not revealing; Neck pain; Neurologic deficit, non traumatic; UE weakness; No known automatically detected potential contraindications to imaging  COMPARISON: None.  TECHNIQUE: MRI Cervical Spine without IV contrast.     FINDINGS:   Significantly motion degraded exam. Straightening of the normal cervical lordosis. Vertebral body heights are maintained. No pathologic marrow signal abnormality. No gross cord signal abnormality allowing for motion degradation. No extraspinal   abnormality.     Craniovertebral junction and C1-C2: Normal.     C2-C3: Normal disc height. No herniation. Normal facets. No spinal canal or neural foraminal stenosis.      C3-C4: Normal disc height. No shallow disc bulge is seen on axial sequences. Probable mild to moderate spinal canal stenosis and severe bilateral foraminal stenosis..       C4-C5: Normal disc height. No herniation. Normal facets. No spinal canal stenosis. Probable moderate to severe bilateral neural foraminal stenosis..      C5-C6: Normal disc height. No herniation. Normal facets. No spinal canal stenosis. No significant right foraminal narrowing. Probable mild left foraminal stenosis..      C6-C7: Normal disc height. No herniation. Normal facets. No spinal canal stenosis. Probable moderate to severe right and severe left foraminal stenosis..      C7-T1: Normal disc height. No herniation. Normal facets. No spinal canal or neural foraminal stenosis.                                                                      IMPRESSION:  1.  Significantly motion degraded exam.  2.  No severe spinal canal stenosis.  3.  Probable mild to moderate spinal canal stenosis C3-4.  4.  There are moderate to severe and severe foraminal stenoses of the C3-4 through C6-7 levels detailed above.       EXAM: XR CERVICAL SPINE 2/3 VIEWS  LOCATION: [facility]  DATE: 4/25/2024     INDICATION: Fall, pain.  COMPARISON: None.                                                                      IMPRESSION: Vertebral bodies are only well seen from C2 to C6 on the lateral view. Straightening of the normal cervical lordosis. No obvious loss of vertebral body height. No significant degenerative endplate changes or loss of disc height.          This note was dictated using voice recognition software. Any grammatical or context distortions are unintentional and inherent to the software.       Medina DIANAP-C  [facility]  O. [phone]

## 2024-11-07 NOTE — PATIENT INSTRUCTIONS
"Refilled naproxen last week, please start taking this    Refilled flexeril 10mg today. Please take one every 8 hrs as needed for muscle spasm.    You can take tylenol as needed for pain as well    Continue your physical therapy. Would suggest continuing current restrictions, meanwhile working on testing lifting, reaching etc. In PT over the next month. If your arm pain continues to be controlled, would suggest release of all restrictions at next appt.      Radicular Pain    Radicular pain in either the arm or leg is usually from a bulging disc in the spine. A portion of the herniated disc may press against the nerves as the nerves exit the spine. This causes pain which is felt down the arm or leg. Other causes of radicular pain may include:  Fractures.  Heart disease.  Cancer.  An abnormal and usually degenerative state of the nervous system or nerves (neuropathy).    In most cases, radicular pain is treated without imaging unless symptoms do not start to improve. If that is the case, your provider may order a CT or MRI scan to determine the cause.     Nerves in the cervical spine (neck) may cause radicular pain into the outer shoulder and down the arm. It can spread down to the thumb and fingers. The symptoms vary depending on which nerve root has been affected. In most cases, radicular pain improves with conservative treatment such as physical therapy, cervical traction, medications, and epidural steroid injections. A program for neck rehabilitation with stretching and strengthening exercises is an important part of management. Treatment may take months, and surgery may be considered as a last resort if the symptoms do not improve.    Nerves in the lumbar spine (lower back) may cause radicular pain into the hip and down the leg. The commonly used term for this type of pain is \"sciatica\". Conservative treatment is also recommended for this problem. Most patients feel better after 2 to 4 weeks of rest and other " supportive care. You should avoid bending, lifting, and all other activities which can make your pain worse. Physical therapy, traction, medications, and epidural steroid injections can be good options to help with your recovery. A program for back injury rehabilitation with stretching and strengthening exercises is an important part of management. Surgery may be considered as a last resort if symptoms do not improve with conservative treatment.     You may take over-the-counter or prescription medicines for pain, discomfort, or fever as directed by your caregiver. Muscle relaxants may help by relieving more severe pain and spasm. Neuropathic medication (such as gabapentin, lyrica, or cymbalta) can help decrease your symptoms of nerve pain as well. Cold or massage can also give significant relief. Spinal manipulation is not recommended as it can increase the degree of disc protrusion. We do not recommend taking narcotic medication such as percocet, oxycodone, norco, dilaudid, or others unless pain is severe and not controlled with any other oral options.    Epidural steroid injections are often effective treatment for radicular pain. These injections deliver strong anti-inflammatory medicine to the area directly around the nerve root in the space between your back bones (vertebrae). Your provider can give you more information about steroid injections. These injections are most effective when given within two weeks of the onset of acute pain. You should see your provider for follow up care as recommended.     In most cases, radicular pain is treated without imaging unless symptoms do not start to improve. If that is the case, your provider may order a CT or MRI scan to determine the cause.     SEEK IMMEDIATE MEDICAL CARE IF:  You develop increased pain, weakness, or numbness in your arm or leg.  You develop difficulty with bladder or bowel control.  You develop abdominal pain.    Document Released: 01/25/2006  Document Revised: 03/11/2013 Document Reviewed: 04/12/2010  Patient Information  2013 [employer].       ~Please call our [facility] Nurse Navigation line [phone] with any questions or concerns about your treatment plan, if symptoms worsen and you would like to be seen urgently, or if you have any new or worsening numbness, weakness, or problems controlling bladder and bowel function.  ~You are also welcome to contact Medina Ruiz via [employer], but please be aware that responses to [employer] message may take 2-3 days due to the high volume of patients seen in clinic.

## 2024-11-07 NOTE — LETTER
November 7, 2024      Eugenio Rodriguez  [address] [address]        To Whom It May Concern:    Eugenio Rodriguez was seen in our clinic. He may return to work with the following restrictions: No lifting, pushing, pulling, bending, twisting, or driving until follow up appointment in 1 mo, or unless cleared sooner by PT. 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         Sincerely,      Medina ESCOBEDOC  [facility]  O. [phone]

## 2024-11-07 NOTE — LETTER
11/7/2024      Eugenio Rodriguez  [address]  [place] [address]      Dear Colleague,    Thank you for referring your patient, Eugenio Rodriguez, to the [facility]. Please see a copy of my visit note below.    ASSESSMENT: Eugenio Rodriguez is a 29 year old male presents for consultation at the request of PCP Clinic, Aminata Lazo, who presents today for follow up  patient evaluation of :     -cervical radiculopathy    Resolution of left arm pain, numbness, and weakness. Ongoing left sided neck pain, sharp with head position. He has done 2 sessions of PT. I suggested resuming naproxen and flexeril (refilled), contining current restrictions and follow up in 1 mo after some additional PT sessions to focus on lifting/reaching, which he must do for his job. If pain continues to improve, I think we can release all restrictions next time.           No data to display                     Diagnoses and all orders for this visit:  Cervical radiculopathy  -     cyclobenzaprine (FLEXERIL) 10 MG tablet; Take 1 tablet (10 mg) by mouth 3 times daily as needed for muscle spasms.           PLAN:  Reviewed spine anatomy and disease process. Discussed diagnosis and treatment options with the patient today. A shared decision making model was used. The patient's values and choices were respected. The following represents what was discussed and decided upon by the provider and the patient.     -DIAGNOSTIC TESTS:  Images were personally reviewed and interpreted and explained to patient today using spine model.   -consider repeating MRI if needed given motion degraded quality    -PHYSICAL THERAPY:    -continue PT  Discussed the importance of core strengthening, ROM, stretching exercises with the patient and how each of these entities is important in decreasing pain.  Explained to the patient that the purpose of physical therapy is to teach the patient a home exercise program.   These exercises need to be performed every day in order to decrease pain and prevent future occurrences of pain.    -MEDICATIONS:    -off of the lyrica  -has naproxen, would suggest starting  -continue flexeril 3x daily as needed   Discussed multiple medication options today with patient. Discussed risks, side effects, and proper use of medications. Patient verbalized understanding.    -INTERVENTIONS:    -Discussed the role of injections with patient today. Patient would be a good candidate in the future for either epidural steroid injections or medial branch blocks if indicated based on symptoms and supported by imaging results.    -PATIENT EDUCATION: Total time of 20 minutes, on the day of service, spent with the patient, reviewing the chart, placing orders, and documenting.   -Today we also discussed the issues related to the pros and cons of the current treatment plan.    -FOLLOW-UP:  4-6wks follow up    Advised patient to call the [facility] Center if symptoms worsen or if they develop red flag symptoms such as numbness, weakness, severe pain uncontrolled by current pain med regimen, or any new or worsening problems controlling bladder and bowel function.   ______________________________________________________________________    SUBJECTIVE:     Resolution of left arm pain, numbness, and weakness. Ongoing left sided neck pain, sharp with head position. Pain level between a 4-6/10. He has done 2 sessions of PT. He is not taking any pain medications. Stopped the lyrica as well.       Per original visit with updated meds/procedure list:    LOBO    Eugenio MIKHAIL AponteXaviererika Rodriguez  is a 29 year old male hx type 2 diabetes, htn  who presents today for new patient evaluation of cervical radiculopathy.    Work injury in March when he fell off of a machine onto his left side. Symptoms started suddenly. Went to ED 4/25  Left sided neck pain into the anterior chest, left arm. into the scap, shoulder, arm, forearm. His has continued.  Pain is 8/10 today, 10/10 at worst.  1mo left arm numbness, arm falling asleep.   The gabapentin was helping to some degree, he recently ran out.  Did have side effects on it however.  He tried to go swimming  few days ago, this made symptoms worse. He felt electricity shocks all over his body.    Xr cervical and lumbar spine done in April.  He has not had any physical therapy yet  No chiropractic care, injections, or previous surgeries    -Treatment to Date:     -Medications:  Ibuprofen 800  Flexeril 10  Robaxin 500  Gabapentin  Lyrica  naproxen    Current Outpatient Medications   Medication Sig Dispense Refill     cyclobenzaprine (FLEXERIL) 10 MG tablet Take 1 tablet (10 mg) by mouth 3 times daily as needed for muscle spasms. 45 tablet 0     ibuprofen (ADVIL,MOTRIN) 800 MG tablet [IBUPROFEN (ADVIL,MOTRIN) 800 MG TABLET] Take 1 tablet (800 mg total) by mouth 3 (three) times a day as needed for pain. 21 tablet 0     naproxen (NAPROSYN) 500 MG tablet Take 1 tablet (500 mg) by mouth 2 times daily (with meals). 60 tablet 0     cyclobenzaprine (FLEXERIL) 10 MG tablet Take 1 tablet (10 mg) by mouth 3 times daily as needed. (Patient not taking: Reported on 11/7/2024) 45 tablet 0     gabapentin (NEURONTIN) 300 MG capsule Take 1 capsule (300 mg) by mouth 3 times daily (Patient not taking: Reported on 11/7/2024) 30 capsule 0     methocarbamol (ROBAXIN) 500 MG tablet Take 1 tablet (500 mg) by mouth 4 times daily as needed for muscle spasms (Patient not taking: Reported on 11/7/2024) 20 tablet 0     methylPREDNISolone (MEDROL DOSEPAK) 4 MG tablet therapy pack Follow Package Directions (Patient not taking: Reported on 11/7/2024) 21 tablet 0     pregabalin (LYRICA) 75 MG capsule Take 1 capsule (75 mg) by mouth 3 times daily. (Patient not taking: Reported on 11/7/2024) 90 capsule 1     pregabalin (LYRICA) 75 MG capsule Take 75mg once daily x 3 days, then twice daily x 3 days, then three times daily ongoing (Patient not taking:  Reported on 11/7/2024) 90 capsule 1     No current facility-administered medications for this visit.       No Known Allergies    Past Medical History:   Diagnosis Date     TBI (traumatic brain injury) (H)         Patient Active Problem List   Diagnosis     Conjunctivitis     Acne     Varicella     Infective otitis externa     HTN (hypertension)     Type 2 diabetes mellitus with microalbuminuria (H)       No past surgical history on file.    No family history on file.    Reviewed past medical, surgical, and family history with patient found on new patient intake packet located in EMR Media tab.     SOCIAL HX: sometimes smokes, no alcohol use, no heavy drinking, no rec drug use    Oswestry (IRIS) Questionnaire:         No data to display                Neck Disability Index:      10/3/2024     9:01 AM   Neck Disability Index (  Christian H. and Jennifer C. 1991. All rights reserved.; used with permission)   SECTION 1 - PAIN INTENSITY 1    SECTION 3 - LIFTING 4    SECTION 4 - READING 2    SECTION 6 - CONCENTRATION 1    SECTION 7 - WORK 3    SECTION 9 - SLEEPING 4    SECTION 10 - RECREATION 4    Count 7   Sum 19       Patient-reported          PHQ-2 Score:       8/29/2024     2:51 PM   PHQ-2 ( 1999 [employer])   Q1: Little interest or pleasure in doing things 0   Q2: Feeling down, depressed or hopeless 1   PHQ-2 Score 1            OBJECTIVE:  BP (!) 170/81   Pulse 107     PHYSICAL EXAMINATION:  --CONSTITUTIONAL: Vital signs as above. No acute distress. The patient is well nourished and well groomed.  --PSYCHIATRIC: The patient is awake, alert, oriented to person, place, and time, and answering questions appropriately with clear speech. Appropriate mood and affect   --HEENT: Sclera are non-injected. Extraocular muscles are intact. Moist oral mucosa.  --SKIN: Skin over the face is clean, dry, intact without rashes.  --RESPIRATORY: Normal rhythm and effort. No abnormal accessory muscle breathing patterns noted.     --NEUROLOGIC: CN  III-XII are grossly intact.   --GROSS MOTOR: Easily arises from a seated position.     --UPPER EXTREMITY MOTOR TESTING:  Shoulder abduction: right 5/5, left 5/5  Triceps: right 5/5 left 5/5  Biceps:right 5/5  left 5/5,   Hand :  right 5/5  left 5/5,   Intrinsics: right 5/5  left 5/5,   Extensors: right 5/5  left 5/5,     --LOWER EXTREMITY MOTOR TESTING  Hip flexion: right 5/5  left 5/5,   Quads:right 5/5  left 5/5,   Hamstrings: right 5/5  left 5/5,   Dorsiflexion: right 5/5  left 5/5,   Plantarflexion: right 5/5  left 5/5,   EHL: right 5/5  left 5/5,     Decreased sensation left upper arm, forearm, and all fingers    --VASCULAR: Warm upper and lower limbs bilaterally. No swelling or color change noted.    --CERVICAL SPINE: Inspection reveals no evidence of deformity or swelling. No point tenderness to palpation of cervical spine. Positive tenderness to palpation of left trap, left lower paraspinal musculature.      RESULTS:   Prior medical records from [facility] and [facility]where were reviewed today.         IMAGING:  Spine imaging was personally reviewed and interpreted today. The images were shown to the patient and the findings were explained using a spine model.    EXAM: MR CERVICAL SPINE W/O CONTRAST  LOCATION: [facility]  DATE: 9/24/2024     INDICATION: cervical radiculopathy, left arm weakness and numbness since trauma in March, xr not revealing; Neck pain; Neurologic deficit, non traumatic; UE weakness; No known automatically detected potential contraindications to imaging  COMPARISON: None.  TECHNIQUE: MRI Cervical Spine without IV contrast.     FINDINGS:   Significantly motion degraded exam. Straightening of the normal cervical lordosis. Vertebral body heights are maintained. No pathologic marrow signal abnormality. No gross cord signal abnormality allowing for motion degradation. No extraspinal   abnormality.     Craniovertebral junction and C1-C2: Normal.      C2-C3: Normal disc height. No herniation. Normal facets. No spinal canal or neural foraminal stenosis.      C3-C4: Normal disc height. No shallow disc bulge is seen on axial sequences. Probable mild to moderate spinal canal stenosis and severe bilateral foraminal stenosis..      C4-C5: Normal disc height. No herniation. Normal facets. No spinal canal stenosis. Probable moderate to severe bilateral neural foraminal stenosis..      C5-C6: Normal disc height. No herniation. Normal facets. No spinal canal stenosis. No significant right foraminal narrowing. Probable mild left foraminal stenosis..      C6-C7: Normal disc height. No herniation. Normal facets. No spinal canal stenosis. Probable moderate to severe right and severe left foraminal stenosis..      C7-T1: Normal disc height. No herniation. Normal facets. No spinal canal or neural foraminal stenosis.                                                                      IMPRESSION:  1.  Significantly motion degraded exam.  2.  No severe spinal canal stenosis.  3.  Probable mild to moderate spinal canal stenosis C3-4.  4.  There are moderate to severe and severe foraminal stenoses of the C3-4 through C6-7 levels detailed above.       EXAM: XR CERVICAL SPINE 2/3 VIEWS  LOCATION: [facility]  DATE: 4/25/2024     INDICATION: Fall, pain.  COMPARISON: None.                                                                      IMPRESSION: Vertebral bodies are only well seen from C2 to C6 on the lateral view. Straightening of the normal cervical lordosis. No obvious loss of vertebral body height. No significant degenerative endplate changes or loss of disc height.          This note was dictated using voice recognition software. Any grammatical or context distortions are unintentional and inherent to the software.       Medina DIANAP-C  [facility]  O. [phone]      Again, thank you for allowing me to participate in the  care of your patient.        Sincerely,        AN Greenbreg CNP

## 2024-11-12 ENCOUNTER — THERAPY VISIT (OUTPATIENT)
Dept: PHYSICAL THERAPY | Facility: REHABILITATION | Age: 29
End: 2024-11-12
Payer: COMMERCIAL

## 2024-11-12 DIAGNOSIS — M54.12 CERVICAL RADICULOPATHY: Primary | ICD-10-CM

## 2024-11-12 PROCEDURE — 97110 THERAPEUTIC EXERCISES: CPT | Mod: GP | Performed by: PHYSICAL THERAPIST

## 2024-11-26 ENCOUNTER — MYC MEDICAL ADVICE (OUTPATIENT)
Dept: PHYSICAL MEDICINE AND REHAB | Facility: CLINIC | Age: 29
End: 2024-11-26

## 2024-11-26 ENCOUNTER — THERAPY VISIT (OUTPATIENT)
Dept: PHYSICAL THERAPY | Facility: REHABILITATION | Age: 29
End: 2024-11-26
Payer: COMMERCIAL

## 2024-11-26 DIAGNOSIS — M54.12 CERVICAL RADICULOPATHY: Primary | ICD-10-CM

## 2024-11-26 PROCEDURE — 97110 THERAPEUTIC EXERCISES: CPT | Mod: GP | Performed by: PHYSICAL THERAPIST

## 2024-12-03 ENCOUNTER — THERAPY VISIT (OUTPATIENT)
Dept: PHYSICAL THERAPY | Facility: REHABILITATION | Age: 29
End: 2024-12-03
Payer: COMMERCIAL

## 2024-12-03 DIAGNOSIS — M54.12 CERVICAL RADICULOPATHY: Primary | ICD-10-CM

## 2024-12-03 PROCEDURE — 97112 NEUROMUSCULAR REEDUCATION: CPT | Mod: GP | Performed by: PHYSICAL THERAPIST

## 2024-12-03 PROCEDURE — 97110 THERAPEUTIC EXERCISES: CPT | Mod: GP | Performed by: PHYSICAL THERAPIST

## 2024-12-10 NOTE — PROGRESS NOTES
Assessment:     Diagnoses and all orders for this visit:  Left cervical radiculopathy  Foraminal stenosis of cervical region  Cervical stenosis of spinal canal  DDD (degenerative disc disease), cervical     Eugenio O Xavier Rodriguez is a 29 year old y.o. male with past medical history significant for type 2 diabetes, htn who presents today for follow-up regarding:     -Left cervical radiculopathy with previous resolution of radicular pain at last visit 9/26/2024, neck pain has improved at this time as well.  Mild neck pain remains.    *Primary spine provider Medina Ruiz CNP, last visit 9/26/2024     Plan:     A shared decision making plan was used.  The patient's values and choices were respected. Prior medical records were reviewed today. The following represents what was discussed and decided upon by the provider and the patient.     -DIAGNOSTIC TESTS: Images were personally reviewed and interpreted.   -- Cervical spine MRI 9/24/2024 with limited view due to significant Motion degraded exam, multilevel degenerative changes.  Mild to moderate spinal stenosis at C3-4 with severe bilateral foraminal stenosis.  Severe bilateral foraminal stenosis at C4-5.  Mild left foraminal stenosis at C5-6.  Moderate to severe right and severe left foraminal stenosis at C6-7.    -INTERVENTIONS: Discussed the possibility of C7-T1 interlaminar epidural steroid injection if things worsen at any point, currently he is doing well.    -MEDICATIONS: Patient has discontinued all medications over the last couple of weeks and is doing well without medication needs at this time.  Discussed side effects of medications and proper use. Patient verbalized understanding.    -PHYSICAL THERAPY: Encourage patient to continue with home exercises from recent physical therapy.  Discussed the importance of core strengthening, ROM, stretching exercises with the patient and how each of these entities is important in decreasing pain.  Explained to the  patient that the purpose of physical therapy is to teach the patient a home exercise program.  These exercises need to be performed every day in order to decrease pain and prevent future occurrences of pain.        -PATIENT EDUCATION: Total time of 32 minutes, on the day of service, spent with the patient, reviewing the chart, placing orders, and documenting.     -FOLLOW UP: Follow-up as needed 4-week timeframe if symptoms are ongoing, otherwise as needed  Advised to contact clinic if symptoms worsen or change.    Subjective:     Eugenio Rodriguez is a 29 year old male who presents today for follow-up regarding previous significant left arm pain that he reports had resolved before his last visit with Medina in September.  Over the last couple of months he has been working with physical therapy still and doing well, does feel like he still has a little bit of left-sided neck pain but it is more intermittent and mild and very tolerable.  Currently pain is a 1/10, a 2 at its worst.  Denies any recent trips or falls or balance changes, denies upper extremity weakness.  He is hoping to return to work at this time as he has not been working since his initial work injury.    *Work injury in March when he fell off of a machine onto his left side. Symptoms started suddenly. 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 21 tablet 0    cyclobenzaprine (FLEXERIL) 10 MG tablet Take 1 tablet (10 mg) by mouth 3 times daily as needed for muscle spasms. (Patient not taking: Reported on 12/11/2024) 45 tablet 0    methylPREDNISolone (MEDROL DOSEPAK) 4 MG tablet therapy pack Follow Package Directions (Patient not taking: Reported on 9/26/2024) 21 tablet 0    naproxen (NAPROSYN) 500 MG tablet Take 1 tablet (500 mg) by mouth 2 times daily (with meals). (Patient not taking: Reported on 12/11/2024) 60 tablet 0    pregabalin (LYRICA) 75 MG capsule Take 1 capsule (75 mg) by mouth 3 times daily. (Patient not taking: Reported on 12/11/2024) 90 capsule 1     No current facility-administered medications for this visit.       No Known Allergies    Past Medical History:   Diagnosis Date    TBI (traumatic brain injury) (H)         Review of Systems  ROS: Specifically negative for bowel/bladder dysfunction, balance changes, headache, dizziness, foot drop, fevers, chills, appetite changes, nausea/vomiting, unexplained weight loss. Otherwise 13 systems reviewed are negative. Please see the patient's intake questionnaire from today for details.    Reviewed Social, Family, Past Medical and Past Surgical history with patient, no significant changes noted since prior visit.     Objective:     BP (!) 146/71 (BP Location: Right arm, Patient Position: Sitting, Cuff Size: Adult Large)   Pulse 91   Ht 5' 8\" (1.727 m)   Wt (!) 390 lb (176.9 kg)   BMI 59.30 kg/m      PHYSICAL EXAMINATION:   --CONSTITUTIONAL: Vital signs as above. No acute distress. The patient is well nourished and well groomed.  --PSYCHIATRIC:  The patient is awake, alert, oriented to person, place, time and answering questions appropriately with clear speech. Appropriate mood and affect   --HEENT: Sclera are non-injected. Extraocular muscles are intact.   --SKIN: Skin over the face, bilateral upper extremities, and posterior torso is clean, dry, intact without " leola.  --RESPIRATORY: Normal rhythm and effort. No abnormal accessory muscle breathing patterns noted.   --GROSS MOTOR: Easily arises from a seated position.   --CERVICAL SPINE: Inspection reveals no evidence of deformity.     Imaging: Spine imaging was reviewed today. The images were shown to the patient and the findings were explained using a spine model.    Cervical spine MRI reviewed

## 2024-12-11 ENCOUNTER — OFFICE VISIT (OUTPATIENT)
Dept: PHYSICAL MEDICINE AND REHAB | Facility: CLINIC | Age: 29
End: 2024-12-11
Payer: COMMERCIAL

## 2024-12-11 VITALS
DIASTOLIC BLOOD PRESSURE: 71 MMHG | SYSTOLIC BLOOD PRESSURE: 146 MMHG | WEIGHT: 315 LBS | HEIGHT: 68 IN | BODY MASS INDEX: 47.74 KG/M2 | HEART RATE: 91 BPM

## 2024-12-11 DIAGNOSIS — M50.30 DDD (DEGENERATIVE DISC DISEASE), CERVICAL: ICD-10-CM

## 2024-12-11 DIAGNOSIS — M54.12 LEFT CERVICAL RADICULOPATHY: Primary | ICD-10-CM

## 2024-12-11 DIAGNOSIS — M48.02 FORAMINAL STENOSIS OF CERVICAL REGION: ICD-10-CM

## 2024-12-11 DIAGNOSIS — M48.02 CERVICAL STENOSIS OF SPINAL CANAL: ICD-10-CM

## 2024-12-11 ASSESSMENT — PAIN SCALES - GENERAL: PAINLEVEL_OUTOF10: NO PAIN (1)

## 2024-12-11 NOTE — LETTER
December 11, 2024      Eugenio Rodriguez  [address] [address]        To Whom It May Concern:    Eugenio Rodriguez was seen in our clinic. He may return to work without restrictions 12/13/2024.       Sincerely,        Senait Us, CNP

## 2024-12-11 NOTE — LETTER
12/11/2024      Eugenio Rodriguez  [address]  [place] [address]      Dear Colleague,    Thank you for referring your patient, Eugenio Rodriguez, to the [facility]. Please see a copy of my visit note below.      Assessment:     Diagnoses and all orders for this visit:  Left cervical radiculopathy  Foraminal stenosis of cervical region  Cervical stenosis of spinal canal  DDD (degenerative disc disease), cervical     Eugenio Rodriguez is a 29 year old y.o. male with past medical history significant for type 2 diabetes, htn who presents today for follow-up regarding:     -Left cervical radiculopathy with previous resolution of radicular pain at last visit 9/26/2024, neck pain has improved at this time as well.  Mild neck pain remains.    *Primary spine provider Medina Ruiz CNP, last visit 9/26/2024     Plan:     A shared decision making plan was used.  The patient's values and choices were respected. Prior medical records were reviewed today. The following represents what was discussed and decided upon by the provider and the patient.     -DIAGNOSTIC TESTS: Images were personally reviewed and interpreted.   -- Cervical spine MRI 9/24/2024 with limited view due to significant Motion degraded exam, multilevel degenerative changes.  Mild to moderate spinal stenosis at C3-4 with severe bilateral foraminal stenosis.  Severe bilateral foraminal stenosis at C4-5.  Mild left foraminal stenosis at C5-6.  Moderate to severe right and severe left foraminal stenosis at C6-7.    -INTERVENTIONS: Discussed the possibility of C7-T1 interlaminar epidural steroid injection if things worsen at any point, currently he is doing well.    -MEDICATIONS: Patient has discontinued all medications over the last couple of weeks and is doing well without medication needs at this time.  Discussed side effects of medications and proper use. Patient verbalized  understanding.    -PHYSICAL THERAPY: Encourage patient to continue with home exercises from recent physical therapy.  Discussed the importance of core strengthening, ROM, stretching exercises with the patient and how each of these entities is important in decreasing pain.  Explained to the patient that the purpose of physical therapy is to teach the patient a home exercise program.  These exercises need to be performed every day in order to decrease pain and prevent future occurrences of pain.        -PATIENT EDUCATION: Total time of 32 minutes, on the day of service, spent with the patient, reviewing the chart, placing orders, and documenting.     -FOLLOW UP: Follow-up as needed 4-week timeframe if symptoms are ongoing, otherwise as needed  Advised to contact clinic if symptoms worsen or change.    Subjective:     Eugenio Rodriguez is a 29 year old male who presents today for follow-up regarding previous significant left arm pain that he reports had resolved before his last visit with Medina in September.  Over the last couple of months he has been working with physical therapy still and doing well, does feel like he still has a little bit of left-sided neck pain but it is more intermittent and mild and very tolerable.  Currently pain is a 1/10, a 2 at its worst.  Denies any recent trips or falls or balance changes, denies upper extremity weakness.  He is hoping to return to work at this time as he has not been working since his initial work injury.    *Work injury in March when he fell off of a machine onto his left side. Symptoms started suddenly. Went to ED 4/25     Treatment to Date:   Physical therapy x 5 sessions cervical radiculopathy, last 12/3/2024    Medications:  Cyclobenzaprine 10 mg  Naproxen 500 mg  Lyrica 75 mg    Past medications:  Methocarbamol  Gabapentin    Medrol Dosepak prescribed 4/25/2024    Patient Active Problem List   Diagnosis     Conjunctivitis     Acne     Varicella     Infective  "otitis externa     HTN (hypertension)     Type 2 diabetes mellitus with microalbuminuria (H)       Current Outpatient Medications   Medication Sig Dispense Refill     ibuprofen (ADVIL,MOTRIN) 800 MG tablet [IBUPROFEN (ADVIL,MOTRIN) 800 MG TABLET] Take 1 tablet (800 mg total) by mouth 3 (three) times a day as needed for pain. 21 tablet 0     cyclobenzaprine (FLEXERIL) 10 MG tablet Take 1 tablet (10 mg) by mouth 3 times daily as needed for muscle spasms. (Patient not taking: Reported on 12/11/2024) 45 tablet 0     methylPREDNISolone (MEDROL DOSEPAK) 4 MG tablet therapy pack Follow Package Directions (Patient not taking: Reported on 9/26/2024) 21 tablet 0     naproxen (NAPROSYN) 500 MG tablet Take 1 tablet (500 mg) by mouth 2 times daily (with meals). (Patient not taking: Reported on 12/11/2024) 60 tablet 0     pregabalin (LYRICA) 75 MG capsule Take 1 capsule (75 mg) by mouth 3 times daily. (Patient not taking: Reported on 12/11/2024) 90 capsule 1     No current facility-administered medications for this visit.       No Known Allergies    Past Medical History:   Diagnosis Date     TBI (traumatic brain injury) (H)         Review of Systems  ROS: Specifically negative for bowel/bladder dysfunction, balance changes, headache, dizziness, foot drop, fevers, chills, appetite changes, nausea/vomiting, unexplained weight loss. Otherwise 13 systems reviewed are negative. Please see the patient's intake questionnaire from today for details.    Reviewed Social, Family, Past Medical and Past Surgical history with patient, no significant changes noted since prior visit.     Objective:     BP (!) 146/71 (BP Location: Right arm, Patient Position: Sitting, Cuff Size: Adult Large)   Pulse 91   Ht 5' 8\" (1.727 m)   Wt (!) 390 lb (176.9 kg)   BMI 59.30 kg/m      PHYSICAL EXAMINATION:   --CONSTITUTIONAL: Vital signs as above. No acute distress. The patient is well nourished and well groomed.  --PSYCHIATRIC:  The patient is awake, " alert, oriented to person, place, time and answering questions appropriately with clear speech. Appropriate mood and affect   --HEENT: Sclera are non-injected. Extraocular muscles are intact.   --SKIN: Skin over the face, bilateral upper extremities, and posterior torso is clean, dry, intact without rashes.  --RESPIRATORY: Normal rhythm and effort. No abnormal accessory muscle breathing patterns noted.   --GROSS MOTOR: Easily arises from a seated position.   --CERVICAL SPINE: Inspection reveals no evidence of deformity.     Imaging: Spine imaging was reviewed today. The images were shown to the patient and the findings were explained using a spine model.    Cervical spine MRI reviewed                  Again, thank you for allowing me to participate in the care of your patient.        Sincerely,        Senait Us, CNP

## 2024-12-11 NOTE — PATIENT INSTRUCTIONS
~Spine Center Scheduling #[phone].  ~Please call our [facility] Nurse Navigation #[phone] with any questions or concerns about your treatment plan, if symptoms worsen and you would like to be seen urgently, or if you have problems controlling bladder and bowel function.  ~For any future flareups or new symptoms, recommend follow-up in clinic or contact the nurse navigator line.  ~Please note that any My Chart messages may take multiple days for a response due to the high volume of patients seen in clinic.  Anything sent Thursday night or after will be answered the following week when able, as Senait Us CNP does not work in clinic on Fridays.   ~Senait Us CNP is at the [facility] on Tuesdays, otherwise primarily at the [facility].       Importance of specialized Physical Therapy: Discussed the importance of core strengthening, ROM, stretching exercises with the patient and how each of these entities is important in decreasing pain.  Explained to the patient that the purpose of physical therapy is to teach the patient a home exercise program individualized to them and their specific health concerns.  These exercises need to be performed every day in order to decrease pain and prevent future occurrences of pain.

## 2025-01-09 ENCOUNTER — THERAPY VISIT (OUTPATIENT)
Dept: PHYSICAL THERAPY | Facility: REHABILITATION | Age: 30
End: 2025-01-09
Payer: COMMERCIAL

## 2025-01-09 DIAGNOSIS — M54.12 CERVICAL RADICULOPATHY: Primary | ICD-10-CM

## 2025-01-19 ENCOUNTER — HEALTH MAINTENANCE LETTER (OUTPATIENT)
Age: 30
End: 2025-01-19

## 2025-03-13 ENCOUNTER — THERAPY VISIT (OUTPATIENT)
Dept: PHYSICAL THERAPY | Facility: REHABILITATION | Age: 30
End: 2025-03-13
Payer: OTHER MISCELLANEOUS

## 2025-03-13 DIAGNOSIS — M54.12 CERVICAL RADICULOPATHY: Primary | ICD-10-CM

## 2025-03-13 NOTE — PROGRESS NOTES
[facility]                                                                                   OUTPATIENT PHYSICAL THERAPY    PLAN OF TREATMENT FOR OUTPATIENT REHABILITATION   Patient's Last Name, First Name, Eugenio Porter YOB: 1995   Provider's Name   [facility]   Medical Record No.  [id number]     Onset Date: 04/25/24  Start of Care Date:       Medical Diagnosis:  M54.12 (ICD-10-CM) - Cervical radiculopathy      PT Treatment Diagnosis:  Cervical radiculopathy Plan of Treatment  Frequency/Duration: 1 to 2 time per week/ 12 weeks    Certification date 1/9/2025 to 6/5/2025       See note for plan of treatment details and functional goals     Vasu Arroyo PT                         I CERTIFY THE NEED FOR THESE SERVICES FURNISHED UNDER        THIS PLAN OF TREATMENT AND WHILE UNDER MY CARE     (Physician attestation of this document indicates review and certification of the therapy plan).              Referring Provider:  Medina Ruiz    Initial Assessment  See Epic Evaluation-           03/13/25 0500   Appointment Info   Signing clinician's name / credentials Vasu Arroyo PT   Total/Authorized Visits E+T   Visits Used 7/12   Medical Diagnosis M54.12 (ICD-10-CM) - Cervical radiculopathy   PT Tx Diagnosis Cervical radiculopathy   Other pertinent information MRI 9/24/2024.  Significantly motion degraded exam.  2.  No severe spinal canal stenosis.  3.  Probable mild to moderate spinal canal stenosis C3-4.  4.  There are moderate to severe and severe foraminal stenoses of the C3-4 through C6-7 levels   Progress Note/Certification   Onset of illness/injury or Date of Surgery 04/25/24   Therapy Frequency 1 to 2 time per week   Predicted Duration 12 weeks   Progress Note Due Date 12/26/24   Progress Note Completed Date 10/03/24   GOALS   PT Goals 2;3   PT Goal 1   Goal Identifier HEP   Goal Description The  "patient will demonstrate independence in home exercise program to aid in home management of symptoms.   Rationale to maximize safety and independence with performance of ADLs and functional tasks;to maximize safety and independence with self cares   Target Date 06/05/25   PT Goal 2   Goal Identifier Increase active shoulder flexion and abduction with 2 with in the normal limits without increase in pain.   Goal Description Increase active shoulder flexion and abduction with 2 with in the normal limits without increase in pain.   Rationale to maximize safety and independence with performance of ADLs and functional tasks;to maximize safety and independence within the home;to maximize safety and independence within the community;to maximize safety and independence with transportation;to maximize safety and independence with self cares   Target Date 06/05/25   PT Goal 3   Goal Identifier Patient will increase cervical spine range of motion by 5 to 10 degrees with no pain so that rotation range of motion is equal and is able to drive without increasing pain   Goal Description Patient will increase cervical spine range of motion by 5 to 10 degrees with no pain so that rotation range of motion is equal and is able to drive without increasing pain   Rationale to maximize safety and independence with performance of ADLs and functional tasks;to maximize safety and independence with transportation   Target Date 06/05/25   Subjective Report   Subjective Report patient reprots everything has been going well. hasn't done heavy lifting at work. still notices shocks of pain occasionally in left upper arm \"when stretching themself\". states doing the exercises have been going well.   Objective Measures   Objective Measures Objective Measure 1;Objective Measure 2   Objective Measure 1   Objective Measure neck ROM   Details L rotation 32 pain, R rotation 40, extension 20 pain, flexion 20 pain   Treatment Interventions (PT) "   Interventions Therapeutic Procedure/Exercise;Neuromuscular Re-education   Therapeutic Procedure/Exercise   Therapeutic Procedures: strength, endurance, ROM, flexibility minutes (48125) 40   Ther Proc 1 - Details UBE seat 13, level 2 resistance (4 min total)   PTRx Ther Proc 1 [place] and Arrow Stretch   PTRx Ther Proc 1 - Details x10 reps ea   PTRx Ther Proc 2 Backlying Arms Overhead   PTRx Ther Proc 2 - Details x10 reps    PTRx Ther Proc 3 Shoulder External Rotation With Theraband Plyometrics   PTRx Ther Proc 3 - Details No Notes   PTRx Ther Proc 4 Pec Corner Stretch   PTRx Ther Proc 4 - Details No Notes   PTRx Ther Proc 5 Cervical ROM Rotation   PTRx Ther Proc 5 - Details x10 instructions to remain in comfortable ROM   PTRx Ther Proc 6 Cable Fly Bilateral   PTRx Ther Proc 6 - Details L5 RB x 15 reps   PTRx Ther Proc 7 Front Pulldowns   PTRx Ther Proc 7 - Details L5 RB x 15 reps   PTRx Ther Proc 8 Levator Scapulae Stretch   PTRx Ther Proc 8 - Details No Notes   PTRx Ther Proc 9 chin tucks   PTRx Ther Proc 9 - Details x15 with 3 folded towels under head to support forward head posture   PTRx Ther Proc 10 All 4s Cat Cow   PTRx Ther Proc 10 - Details x10   PTRx Ther Proc 11 Thoracic Extension   PTRx Ther Proc 11 - Details x10   PTRx Ther Proc 12 Wall Push Up Plus   PTRx Ther Proc 12 - Details x5, left arm weakness   PTRx Ther Proc 13 countertop push-up   PTRx Ther Proc 13 - Details x3 discontinued due to left arm weakness   PTRx Ther Proc 14 Thoracic Extension   PTRx Ther Proc 14 - Details not today   Skilled Intervention Skilled physical therapy needed to instruct patient in proper form for home exercise program and as we continue physical therapy progression of home exercise program.   Patient Response/Progress Patient was challenged with cervical retraction had a tendency to go too far but was able to do so with many verbal and tactile cues.  Patient was able to do scapular retraction but needed many verbal and  tactile cues to keep the shoulders down when pulling them back.   Neuromuscular Re-education   Neuro Re-ed 1 All 4s Cat Cow   Neuro Re-ed 1 - Details x10   PTRx Neuro Re-ed 1 Cervical Retraction   PTRx Neuro Re-ed 1 - Details 2 x 10 reps with lazer x10 w/o lazer and with overpressure   Education   Learner/Method Patient   Education Comments discussed return to work, patient feels ready and wants to go back to work. encouraged him to have PT appointments scheduled for after return to work to be able to assess how the return to work is going.   Plan   Home program PTRx  [id number]   Plan for next session UBE seat 13, mat table ROM, cat cow, wall push ups try to progress to countertop push-up   Comments   Comments Patient returns for follow-up with improvements in neck and left arm pain, though still having mild left sided neck pain/irritability with neck range of motion and pushing movements involving left upper extremity. Overall, patient has reduced flexibility in upper back and shoulders which likely contributes to neck pain. The following significant findings have been identified, pain with sitting up straight cervical flexion and turning to the left, and weakenss with left upper extremity pushing movements. These impairments interfere with their ability to perform self care tasks, work tasks, recreational activities, household chores, and driving  as compared to previous level of function.   Total Session Time   Timed Code Treatment Minutes 40   Total Treatment Time (sum of timed and untimed services) 40       Referring Provider:  Medina Ruiz

## 2025-04-27 ENCOUNTER — HEALTH MAINTENANCE LETTER (OUTPATIENT)
Age: 30
End: 2025-04-27

## 2025-05-21 ENCOUNTER — THERAPY VISIT (OUTPATIENT)
Dept: PHYSICAL THERAPY | Facility: REHABILITATION | Age: 30
End: 2025-05-21
Payer: OTHER MISCELLANEOUS

## 2025-05-21 DIAGNOSIS — M54.12 CERVICAL RADICULOPATHY: Primary | ICD-10-CM

## 2025-05-21 PROCEDURE — 97110 THERAPEUTIC EXERCISES: CPT | Mod: GP | Performed by: PHYSICAL THERAPIST

## 2025-05-21 PROCEDURE — 97164 PT RE-EVAL EST PLAN CARE: CPT | Mod: GP | Performed by: PHYSICAL THERAPIST

## 2025-05-21 NOTE — PROGRESS NOTES
[facility]                                                                                   OUTPATIENT PHYSICAL THERAPY    PLAN OF TREATMENT FOR OUTPATIENT REHABILITATION   Patient's Last Name, First Name, Eugenio Porter YOB: 1995   Provider's Name   [facility]   Medical Record No.  [id number]     Onset Date: 04/25/24  Start of Care Date:  10/3/2024.     Medical Diagnosis:  M54.12 (ICD-10-CM) - Cervical radiculopathy      PT Treatment Diagnosis:  Cervical radiculopathy Plan of Treatment  Frequency/Duration: weekly/ 36 weeks from start of care (10/3/2024)    Certification date from 10/3/2024 to 8/13/2025       See note for plan of treatment details and functional goals     Vasu Arroyo PT                         I CERTIFY THE NEED FOR THESE SERVICES FURNISHED UNDER        THIS PLAN OF TREATMENT AND WHILE UNDER MY CARE     (Physician attestation of this document indicates review and certification of the therapy plan).              Referring Provider:  Medina Ruiz    Initial Assessment  See Epic Evaluation-              PLAN  Continue therapy per current plan of care.    Beginning/End Dates of Progress Note Reporting Period:  12/26/2024 to 05/21/2025    Referring Provider:  Medina Ruiz       05/21/25 0500   Appointment Info   Signing clinician's name / credentials Vasu Arroyo PT   Total/Authorized Visits E+T   Visits Used 8/12   Medical Diagnosis M54.12 (ICD-10-CM) - Cervical radiculopathy   PT Tx Diagnosis Cervical radiculopathy   Other pertinent information MRI 9/24/2024.  Significantly motion degraded exam.  2.  No severe spinal canal stenosis.  3.  Probable mild to moderate spinal canal stenosis C3-4.  4.  There are moderate to severe and severe foraminal stenoses of the C3-4 through C6-7 levels   Progress Note/Certification   Onset of illness/injury or Date of Surgery 04/25/24    Therapy Frequency weekly   Predicted Duration 36 weeks from start of care (10/3/2024)   Progress Note Due Date 08/13/25   Progress Note Completed Date 05/21/25   GOALS   PT Goals 2;3   PT Goal 1   Goal Identifier HEP   Goal Description The patient will demonstrate independence in home exercise program to aid in home management of symptoms.   Rationale to maximize safety and independence with performance of ADLs and functional tasks;to maximize safety and independence with self cares   Target Date 08/13/25   PT Goal 2   Goal Identifier Increase active shoulder flexion and abduction with 2 with in the normal limits without increase in pain.   Goal Description Increase active shoulder flexion and abduction with 2 with in the normal limits without increase in pain.   Rationale to maximize safety and independence with performance of ADLs and functional tasks;to maximize safety and independence within the home;to maximize safety and independence within the community;to maximize safety and independence with transportation;to maximize safety and independence with self cares   Target Date 08/13/25   PT Goal 3   Goal Identifier Patient will increase cervical spine range of motion by 5 to 10 degrees with no pain so that rotation range of motion is equal and is able to drive without increasing pain   Goal Description Patient will increase cervical spine range of motion by 5 to 10 degrees with no pain so that rotation range of motion is equal and is able to drive without increasing pain   Rationale to maximize safety and independence with performance of ADLs and functional tasks;to maximize safety and independence with transportation   Target Date 08/13/25   Subjective Report   Subjective Report patient reports they are having increased neck pain on left side 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 they feel shocks of pain in left arm when doing strenuous activity at work. they feel weakness  in left arm. they have a  and are being informed that the employer may be trying to terminate them, though it would be difficult because they are union. they want to know more about strength in their arm, they feel the exercises are helpful at times.   Objective Measures   Objective Measures Objective Measure 1;Objective Measure 2;Objective Measure 3   Objective Measure 1   Objective Measure neck ROM   Details left 42 stiffness, right 56, flexion 24 sharp, extension 50 sharp, left side bend 20, right side bend 30, protraction WNL, retraction limited by 25% with neck pain   Objective Measure 2   Objective Measure UE myotomes   Details WNL except L C7 4/5, and T1 4/5   Objective Measure 3   Objective Measure deep neck flexor endurance   Details 9 seconds, painful   Treatment Interventions (PT)   Interventions Therapeutic Procedure/Exercise;Neuromuscular Re-education   Therapeutic Procedure/Exercise   Therapeutic Procedures: strength, endurance, ROM, flexibility minutes (01984) 15   Ther Proc 1 - Details UBE seat 13, level 2 resistance (4 min total)   PTRx Ther Proc 1 [place] and Arrow Stretch   PTRx Ther Proc 1 - Details x10 reps ea   PTRx Ther Proc 2 Backlying Arms Overhead   PTRx Ther Proc 2 - Details x10 reps    PTRx Ther Proc 3 Shoulder External Rotation With Theraband Plyometrics   PTRx Ther Proc 3 - Details No Notes   PTRx Ther Proc 4 Pec Corner Stretch   PTRx Ther Proc 4 - Details No Notes   PTRx Ther Proc 5 Cervical ROM Rotation   PTRx Ther Proc 5 - Details x10 instructions to remain in comfortable ROM   PTRx Ther Proc 6 Cable Fly Bilateral   PTRx Ther Proc 6 - Details L5 RB x 15 reps   PTRx Ther Proc 7 Front Pulldowns   PTRx Ther Proc 7 - Details L5 RB x 15 reps   PTRx Ther Proc 8 Levator Scapulae Stretch   PTRx Ther Proc 8 - Details No Notes   PTRx Ther Proc 9 chin tucks   PTRx Ther Proc 9 - Details x15 with 3 folded towels under head to support forward head posture   PTRx Ther Proc 10 All 4s Cat Cow    PTRx Ther Proc 10 - Details x10   PTRx Ther Proc 11 Thoracic Extension   PTRx Ther Proc 11 - Details x10   PTRx Ther Proc 12 Wall Push Up Plus   PTRx Ther Proc 12 - Details x5, left arm weakness   PTRx Ther Proc 13 countertop push-up   PTRx Ther Proc 13 - Details x3 discontinued due to left arm weakness   PTRx Ther Proc 14 Thoracic Extension   PTRx Ther Proc 14 - Details not today   Skilled Intervention Skilled physical therapy needed to instruct patient in proper form for home exercise program and as we continue physical therapy progression of home exercise program.   Patient Response/Progress Patient was challenged with cervical retraction had a tendency to go too far but was able to do so with many verbal and tactile cues.  Patient was able to do scapular retraction but needed many verbal and tactile cues to keep the shoulders down when pulling them back.   Neuromuscular Re-education   Neuro Re-ed 1 All 4s Cat Cow   Neuro Re-ed 1 - Details x10   PTRx Neuro Re-ed 1 Cervical Retraction   PTRx Neuro Re-ed 1 - Details 2 x 10 reps with lazer x10 w/o lazer and with overpressure   Eval/Assessments   Assessments PT Re-Eval   PT Eval, Re-eval Minutes (45087) 25   Education   Learner/Method Patient   Education Comments discussed return to work, patient feels ready and wants to go back to work. encouraged him to have PT appointments scheduled for after return to work to be able to assess how the return to work is going.   Plan   Home program PTRx  [id number]   Plan for next session UBE seat 13, mat table ROM, cat cow, wall push ups try to progress to countertop push-up   Comments   Comments Patient returns for their eighth visit of physical therapy, first follow-up in over 2 months.  Unfortunately patient returned to clinic today with increased left neck pain and reports of left arm weakness since returning back to work.  A reevaluation was performed which demonstrated neck mobility deficits, left myotome weakness, and  weakness of deep neck flexors.  Patient was instructed in low level strengthening exercises and encouraged to follow-up in physical therapy once per week for at least 4 weeks to monitor symptoms.  Patient is appropriate for ongoing skilled physical therapy   Total Session Time   Timed Code Treatment Minutes 15   Total Treatment Time (sum of timed and untimed services) 40

## 2025-06-23 ENCOUNTER — TELEPHONE (OUTPATIENT)
Dept: PHYSICAL THERAPY | Facility: REHABILITATION | Age: 30
End: 2025-06-23

## 2025-06-23 NOTE — TELEPHONE ENCOUNTER
Called and spoke with patient today regarding today's missed appointment. Informed patient about their next scheduled appointment which patient confirmed they plan on attending.

## 2025-06-30 ENCOUNTER — TELEPHONE (OUTPATIENT)
Dept: PHYSICAL THERAPY | Facility: REHABILITATION | Age: 30
End: 2025-06-30
Payer: COMMERCIAL

## 2025-06-30 NOTE — TELEPHONE ENCOUNTER
Called patient however no answer and did not go to voicemail. Attempted to reach patient about today's no show. [employer] message will be sent about appt missed today and request to confirm future appointments within next 24 hours otherwise they will be cancelled.

## 2025-08-10 ENCOUNTER — HEALTH MAINTENANCE LETTER (OUTPATIENT)
Age: 30
End: 2025-08-10